# Patient Record
Sex: FEMALE | Race: WHITE | NOT HISPANIC OR LATINO | Employment: PART TIME | URBAN - METROPOLITAN AREA
[De-identification: names, ages, dates, MRNs, and addresses within clinical notes are randomized per-mention and may not be internally consistent; named-entity substitution may affect disease eponyms.]

---
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
## 2023-03-16 ENCOUNTER — TELEPHONE (OUTPATIENT)
Dept: FAMILY MEDICINE CLINIC | Facility: CLINIC | Age: 18
End: 2023-03-16

## 2023-03-16 NOTE — TELEPHONE ENCOUNTER
Caller: Natalya GEORGE    Relationship: Self    Best call back number: [phone]    What orders are you requesting (i.e. lab or imaging): BLOOD WORK    In what timeframe would the patient need to come in: ASAP    Additional notes: PATIENT WAS SCHEDULED FOR AN APPOINTMENT ON 3.16.2023, BUT WOULD PREFER TO JUST HAVE BLOOD WORK DONE TO SEE WHAT IS CAUSING HER PAIN IN ARMS. PLEASE CALL AND ADVISE.

## 2023-03-16 NOTE — TELEPHONE ENCOUNTER
Informed pt that we could not place lab work without seeing her and knowing her symptoms. Voiced understanding.

## 2023-03-17 ENCOUNTER — OFFICE VISIT (OUTPATIENT)
Dept: OBSTETRICS AND GYNECOLOGY | Facility: CLINIC | Age: 18
End: 2023-03-17
Payer: COMMERCIAL

## 2023-03-17 VITALS
HEIGHT: 67 IN | SYSTOLIC BLOOD PRESSURE: 104 MMHG | WEIGHT: 164.6 LBS | HEART RATE: 83 BPM | DIASTOLIC BLOOD PRESSURE: 72 MMHG | BODY MASS INDEX: 25.83 KG/M2

## 2023-03-17 DIAGNOSIS — R10.2 PELVIC PAIN: ICD-10-CM

## 2023-03-17 DIAGNOSIS — N76.0 ACUTE VAGINITIS: Primary | ICD-10-CM

## 2023-03-17 DIAGNOSIS — Z30.41 ENCOUNTER FOR SURVEILLANCE OF CONTRACEPTIVE PILLS: ICD-10-CM

## 2023-03-17 DIAGNOSIS — N94.10 DYSPAREUNIA IN FEMALE: ICD-10-CM

## 2023-03-17 LAB
B-HCG UR QL: NEGATIVE
BILIRUB BLD-MCNC: NEGATIVE MG/DL
C TRACH RRNA CVX QL NAA+PROBE: NOT DETECTED
CANDIDA SPECIES: POSITIVE
EXPIRATION DATE: NORMAL
GARDNERELLA VAGINALIS: NEGATIVE
GLUCOSE UR STRIP-MCNC: NEGATIVE MG/DL
INTERNAL NEGATIVE CONTROL: NORMAL
INTERNAL POSITIVE CONTROL: NORMAL
KETONES UR QL: NEGATIVE
LEUKOCYTE EST, POC: NEGATIVE
Lab: NORMAL
N GONORRHOEA RRNA SPEC QL NAA+PROBE: NOT DETECTED
NITRITE UR-MCNC: NEGATIVE MG/ML
PH UR: 5 [PH] (ref 5–8)
PROT UR STRIP-MCNC: NEGATIVE MG/DL
RBC # UR STRIP: NEGATIVE /UL
SP GR UR: 1.01 (ref 1–1.03)
T VAGINALIS DNA VAG QL PROBE+SIG AMP: NEGATIVE
UROBILINOGEN UR QL: NORMAL

## 2023-03-17 PROCEDURE — 87480 CANDIDA DNA DIR PROBE: CPT | Performed by: NURSE PRACTITIONER

## 2023-03-17 PROCEDURE — 1160F RVW MEDS BY RX/DR IN RCRD: CPT | Performed by: NURSE PRACTITIONER

## 2023-03-17 PROCEDURE — 87510 GARDNER VAG DNA DIR PROBE: CPT | Performed by: NURSE PRACTITIONER

## 2023-03-17 PROCEDURE — 1159F MED LIST DOCD IN RCRD: CPT | Performed by: NURSE PRACTITIONER

## 2023-03-17 PROCEDURE — 87491 CHLMYD TRACH DNA AMP PROBE: CPT | Performed by: NURSE PRACTITIONER

## 2023-03-17 PROCEDURE — 87660 TRICHOMONAS VAGIN DIR PROBE: CPT | Performed by: NURSE PRACTITIONER

## 2023-03-17 PROCEDURE — 99214 OFFICE O/P EST MOD 30 MIN: CPT | Performed by: NURSE PRACTITIONER

## 2023-03-17 PROCEDURE — 87591 N.GONORRHOEAE DNA AMP PROB: CPT | Performed by: NURSE PRACTITIONER

## 2023-03-17 PROCEDURE — 81025 URINE PREGNANCY TEST: CPT | Performed by: NURSE PRACTITIONER

## 2023-03-17 RX ORDER — ACETAMINOPHEN AND CODEINE PHOSPHATE 120; 12 MG/5ML; MG/5ML
1 SOLUTION ORAL DAILY
Qty: 84 TABLET | Refills: 3 | Status: SHIPPED | OUTPATIENT
Start: 2023-03-17 | End: 2024-03-16

## 2023-03-17 NOTE — PROGRESS NOTES
GYN Problem/Follow Up Visit    Chief Complaint   Patient presents with   • Follow-up     Possible Vaginal Infection           HPI  Natalya GEORGE is a 17 y.o. female, , who presents for painful intercourse, vaginal discharge, vaginal burning and itching x 2 months  Pelvic pain s/p csection- sharp poke comes and goes, occurs during sitting, intercourse, cervix feels sensitive.    Lactating, not using birth control, desires birth control, lo lo estrin caused bloating, didn't like it and discontinued after 1-2 month    No menstrual period    Limited vaginal lubrication s/p , all lubricants used otc cause vaginal burning sensation, occassional spotting with and after intercourse lasting a few hours  Hx of BV/yeast    Experienced a traumatic birth    Additional OB/GYN History   No LMP recorded. (Menstrual status: Other).  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 LABOR DELIVERY;  Service: Gynecology;  Laterality: N/A;   • TOOTH EXTRACTION     • WISDOM TOOTH EXTRACTION        Family History   Problem Relation Age of Onset   • Diabetes Mother    • Heart disease Mother    • Other Mother    • Hypertension Father    • Mitral valve prolapse Maternal Grandmother    • Mitral valve prolapse Paternal Grandmother    • Lung cancer Other      Allergies as of 2023 - Reviewed 2023   Allergen Reaction Noted   • Amoxicillin Rash 10/01/2019   • Amoxicillin-pot clavulanate Nausea Only and Rash 2012   • Cefprozil Rash 2012   • Sertraline Other (See Comments) 10/09/2019      The additional following  "portions of the patient's history were reviewed and updated as appropriate: allergies, current medications, past family history, past medical history, past social history, past surgical history and problem list.    Review of Systems    See HPI for pertinent ROS    Objective   /72   Pulse 83   Ht 170.2 cm (67\")   Wt 74.7 kg (164 lb 9.6 oz)   Breastfeeding Yes   BMI 25.78 kg/m²     Physical Exam  Vitals and nursing note reviewed. Exam conducted with a chaperone present.   Constitutional:       Appearance: Normal appearance.   Cardiovascular:      Rate and Rhythm: Normal rate.   Pulmonary:      Effort: Pulmonary effort is normal.   Abdominal:      Palpations: Abdomen is soft.   Genitourinary:     General: Normal vulva.      Vagina: No signs of injury. Tenderness (mild levator spasm) present. No vaginal discharge, erythema, bleeding or lesions.      Cervix: Normal.      Uterus: Normal. Tender (mildly). Not enlarged and not fixed.       Adnexa:         Right: Tenderness (mild) present. No mass or fullness.          Left: Tenderness (mild) present. No mass or fullness.     Lymphadenopathy:      Lower Body: No right inguinal adenopathy. No left inguinal adenopathy.   Skin:     General: Skin is warm and dry.   Neurological:      Mental Status: She is alert and oriented to person, place, and time.            Assessment and Plan    Diagnoses and all orders for this visit:    1. Acute vaginitis (Primary)  -     Chlamydia trachomatis, Neisseria gonorrhoeae, PCR - Swab, Cervix  -     Gardnerella vaginalis, Trichomonas vaginalis, Candida albicans, DNA - Swab, Vagina    2. Dyspareunia in female  -     Chlamydia trachomatis, Neisseria gonorrhoeae, PCR - Swab, Cervix  -     Gardnerella vaginalis, Trichomonas vaginalis, Candida albicans, DNA - Swab, Vagina  -     US Pelvis Transvaginal Non OB; Future    3. Pelvic pain  -     POC Urinalysis Dipstick  -     POC Pregnancy, Urine  -     US Pelvis Transvaginal Non OB; " Future    4. Encounter for surveillance of contraceptive pills  -     norethindrone (MICRONOR) 0.35 MG tablet; Take 1 tablet by mouth Daily.  Dispense: 84 tablet; Refill: 3      HCG, Urine, QL   Date Value Ref Range Status   03/17/2023 Negative Negative Final      Color   Date Value Ref Range Status   05/13/2022 Yellow Yellow, Straw, Dark Yellow, Astrid Final     Clarity, UA   Date Value Ref Range Status   05/13/2022 Clear (A) Clear Final     Specific Gravity    Date Value Ref Range Status   03/17/2023 1.015 1.005 - 1.030 Final     pH, Urine   Date Value Ref Range Status   03/17/2023 5.0 5.0 - 8.0 Final     Leukocytes   Date Value Ref Range Status   03/17/2023 Negative Negative Final     Nitrite, UA   Date Value Ref Range Status   03/17/2023 Negative Negative Final     Protein, POC   Date Value Ref Range Status   03/17/2023 Negative Negative mg/dL Final     Glucose, UA   Date Value Ref Range Status   03/17/2023 Negative Negative mg/dL Final     Ketones, UA   Date Value Ref Range Status   03/17/2023 Negative Negative Final     Urobilinogen, UA   Date Value Ref Range Status   03/17/2023 Normal Normal, 0.2 E.U./dL Final     Bilirubin   Date Value Ref Range Status   03/17/2023 Negative Negative Final     Blood, UA   Date Value Ref Range Status   03/17/2023 Negative Negative Final     Lot Number   Date Value Ref Range Status   03/17/2023 [id number]  Final     Expiration Date   Date Value Ref Range Status   03/17/2023 4/30/2024  Final      Counseling:  • TORSION precautions.  To ER immediately.  Questions answered.  She agrees to FU prn worsening or persistent pain.  • Recommend dilator therapy, s/p traumatic birth, mild levator ani spasm  • To minimize recurrence of Bacterial Vaginosis, recommend gentle cleansing of genital region, use hypoallergenic, Ph balanced, sensitive skin products. Avoid scented liners and tampons. Condom use, avoid multiple partners, avoid douching, bubble bath, anal contact during intercourse,  thong underwear, and shaving in genital area.   • Will call with results of the pelvic ultrasound and labs.  • Desires to restart the Norethindrone for birth control, reminded of time sensitive dosing.     She understands the importance of having the above orders performed in a timely fashion.  The risks of not performing them include, but are not limited to, cancer and/or subsequent increase in morbidity and/or mortality.  She is encouraged to review her results online and/or contact or office if she has questions.     Follow Up:  Return if symptoms worsen or fail to improve, for if diagnostic testing normal, will schedule dilator therapy.        Kaylie Tobar, APRN  03/17/2023

## 2023-03-19 DIAGNOSIS — B37.31 VAGINAL YEAST INFECTION: Primary | ICD-10-CM

## 2023-03-19 RX ORDER — FLUCONAZOLE 100 MG/1
100 TABLET ORAL
Qty: 2 TABLET | Refills: 0 | Status: SHIPPED | OUTPATIENT
Start: 2023-03-19 | End: 2023-03-28

## 2023-03-28 RX ORDER — FLUCONAZOLE 100 MG/1
TABLET ORAL
Qty: 2 TABLET | Refills: 0 | Status: SHIPPED | OUTPATIENT
Start: 2023-03-28

## 2023-04-13 ENCOUNTER — HOSPITAL ENCOUNTER (OUTPATIENT)
Dept: ULTRASOUND IMAGING | Facility: HOSPITAL | Age: 18
Discharge: HOME | End: 2023-04-13
Admitting: NURSE PRACTITIONER
Payer: COMMERCIAL

## 2023-04-13 DIAGNOSIS — N94.10 DYSPAREUNIA IN FEMALE: ICD-10-CM

## 2023-04-13 DIAGNOSIS — R10.2 PELVIC PAIN: ICD-10-CM

## 2023-04-13 PROCEDURE — 76830 TRANSVAGINAL US NON-OB: CPT

## 2023-04-13 PROCEDURE — 76856 US EXAM PELVIC COMPLETE: CPT

## 2023-08-23 ENCOUNTER — OFFICE VISIT (OUTPATIENT)
Dept: FAMILY MEDICINE CLINIC | Facility: CLINIC | Age: 18
End: 2023-08-23
Payer: COMMERCIAL

## 2023-08-23 VITALS
TEMPERATURE: 98 F | HEIGHT: 67 IN | WEIGHT: 179.6 LBS | HEART RATE: 69 BPM | DIASTOLIC BLOOD PRESSURE: 62 MMHG | SYSTOLIC BLOOD PRESSURE: 112 MMHG | BODY MASS INDEX: 28.19 KG/M2 | OXYGEN SATURATION: 98 %

## 2023-08-23 DIAGNOSIS — Z20.2 POSSIBLE EXPOSURE TO STD: ICD-10-CM

## 2023-08-23 DIAGNOSIS — F33.0 MILD EPISODE OF RECURRENT MAJOR DEPRESSIVE DISORDER: ICD-10-CM

## 2023-08-23 DIAGNOSIS — R53.83 FATIGUE, UNSPECIFIED TYPE: ICD-10-CM

## 2023-08-23 DIAGNOSIS — R20.0 NUMBNESS AND TINGLING: ICD-10-CM

## 2023-08-23 DIAGNOSIS — Z01.89 ROUTINE LAB DRAW: ICD-10-CM

## 2023-08-23 DIAGNOSIS — R20.2 NUMBNESS AND TINGLING: ICD-10-CM

## 2023-08-23 DIAGNOSIS — Z13.220 SCREENING FOR LIPID DISORDERS: ICD-10-CM

## 2023-08-23 DIAGNOSIS — M79.675 PAIN OF TOE OF LEFT FOOT: ICD-10-CM

## 2023-08-23 DIAGNOSIS — F41.9 ANXIETY: Primary | ICD-10-CM

## 2023-08-23 RX ORDER — PREDNISONE 20 MG/1
20 TABLET ORAL DAILY
Qty: 5 TABLET | Refills: 0 | Status: SHIPPED | OUTPATIENT
Start: 2023-08-23

## 2023-08-23 RX ORDER — ESCITALOPRAM OXALATE 5 MG/1
5 TABLET ORAL DAILY
COMMUNITY
Start: 2023-08-17

## 2023-08-23 NOTE — PROGRESS NOTES
"Natalya GEORGE presents to [facility] with complaints of numbness and tingling in left great toe, worsening anxiety, and is due for annual blood work.      History of Present Illness  This is a 18-year-old female who presents to the clinic with complaints of numbness and tingling left great toe, worsening anxiety, and is due for annual blood work.    Patient states that for the past several months she has noticed that the right side of her left great toe is numb.  States that she does not recall an injury, there is nothing that seems to trigger this, but just notices it.  Denies any pain, denies any swelling, denies any other symptoms.  Did want to mention, was unsure what could have caused this.  States that it does bother her, and she is just concerned that it might get worse.    Anxiety: Worsened.  Does continue to follow-up with psychiatry with Indira Ignacio.  Was on higher dose Lexapro, decreased herself down due to not feeling like herself and feeling like a zombie.  Currently remains on Lamictal.  Has tried several other medications in the past.  Work is very stressful, at this point does not know what else to do.  Had GeneSight done several years ago, but would be willing to do another test to see what other medicines work best for her.  Also would like to see a new psychiatrist.    Due for blood work, will order.    The following portions of the patient's history were personally reviewed and updated as appropriate: allergies, current medications, past medical history, past surgical history, past family history, and past social history.       Objective   Vital Signs:   /62 (BP Location: Left arm, Patient Position: Sitting, Cuff Size: Adult)   Pulse 69   Temp 98 øF (36.7 øC) (Temporal)   Ht 170.2 cm (67\")   Wt 81.5 kg (179 lb 9.6 oz)   SpO2 98%   BMI 28.13 kg/mý     Body mass index is 28.13 kg/mý.    All labs, imaging, test results, and specialty provider notes " reviewed with patient.     Physical Exam  Vitals reviewed.   Constitutional:       Appearance: Normal appearance.   Cardiovascular:      Rate and Rhythm: Normal rate and regular rhythm.      Pulses: Normal pulses.      Heart sounds: Normal heart sounds.   Pulmonary:      Effort: Pulmonary effort is normal.      Breath sounds: Normal breath sounds.   Neurological:      General: No focal deficit present.      Mental Status: She is alert and oriented to person, place, and time.                       Assessment and Plan:  Diagnoses and all orders for this visit:    1. Anxiety (Primary)  Assessment & Plan:  Worsened.  Obtain ID Genex.  Refer to different psychiatrist.  Continue Lamictal and Lexapro currently.  Strict ER precautions for any suicidal thoughts or ideation.    Orders:  -     Ambulatory Referral to Psychiatry    2. Screening for lipid disorders  -     Lipid Panel; Future    3. Routine lab draw  -     CBC Auto Differential; Future  -     Comprehensive Metabolic Panel; Future  -     TSH Rfx On Abnormal To Free T4; Future    4. Possible exposure to STD  Comments:  Was stuck with a needle at work, obtain testing.  Orders:  -     HSV 1 and 2-Specific Ab, IgG; Future  -     HIV-1/O/2 ANTIGEN/ANTIBODY, 4TH GENERATION; Future    5. Mild episode of recurrent major depressive disorder  -     Ambulatory Referral to Psychiatry    6. Numbness and tingling  Comments:  Question whether underlying injury could be contributing.  Give burst of prednisone.  Orders:  -     predniSONE (DELTASONE) 20 MG tablet; Take 1 tablet by mouth Daily.  Dispense: 5 tablet; Refill: 0    7. Pain of toe of left foot  -     predniSONE (DELTASONE) 20 MG tablet; Take 1 tablet by mouth Daily.  Dispense: 5 tablet; Refill: 0    8. Fatigue, unspecified type  -     Vitamin B12 & Folate; Future          Follow Up:  Return in about 3 months (around 11/23/2023).    Patient was given instructions and counseling regarding her condition or for health  maintenance advice. Please see specific information pulled into the AVS if appropriate.

## 2023-08-23 NOTE — ASSESSMENT & PLAN NOTE
Worsened.  Obtain ID Genex.  Refer to different psychiatrist.  Continue Lamictal and Lexapro currently.  Strict ER precautions for any suicidal thoughts or ideation.

## 2023-08-24 ENCOUNTER — LAB (OUTPATIENT)
Dept: LAB | Facility: HOSPITAL | Age: 18
End: 2023-08-24
Payer: COMMERCIAL

## 2023-08-24 DIAGNOSIS — Z13.220 SCREENING FOR LIPID DISORDERS: ICD-10-CM

## 2023-08-24 DIAGNOSIS — Z01.89 ROUTINE LAB DRAW: ICD-10-CM

## 2023-08-24 DIAGNOSIS — R53.83 FATIGUE, UNSPECIFIED TYPE: ICD-10-CM

## 2023-08-24 DIAGNOSIS — Z20.2 POSSIBLE EXPOSURE TO STD: ICD-10-CM

## 2023-08-24 LAB
ALBUMIN SERPL-MCNC: 4.7 G/DL (ref 3.5–5.2)
ALBUMIN/GLOB SERPL: 2.4 G/DL
ALP SERPL-CCNC: 66 U/L (ref 39–117)
ALT SERPL W P-5'-P-CCNC: 21 U/L (ref 1–33)
ANION GAP SERPL CALCULATED.3IONS-SCNC: 7.1 MMOL/L (ref 5–15)
AST SERPL-CCNC: 21 U/L (ref 1–32)
BASOPHILS # BLD AUTO: 0.03 10*3/MM3 (ref 0–0.2)
BASOPHILS NFR BLD AUTO: 0.6 % (ref 0–1.5)
BILIRUB SERPL-MCNC: 0.3 MG/DL (ref 0–1.2)
BUN SERPL-MCNC: 12 MG/DL (ref 6–20)
BUN/CREAT SERPL: 20.7 (ref 7–25)
CALCIUM SPEC-SCNC: 9.3 MG/DL (ref 8.6–10.5)
CHLORIDE SERPL-SCNC: 104 MMOL/L (ref 98–107)
CHOLEST SERPL-MCNC: 183 MG/DL (ref 0–200)
CO2 SERPL-SCNC: 25.9 MMOL/L (ref 22–29)
CREAT SERPL-MCNC: 0.58 MG/DL (ref 0.57–1)
DEPRECATED RDW RBC AUTO: 38.3 FL (ref 37–54)
EGFRCR SERPLBLD CKD-EPI 2021: 133.1 ML/MIN/1.73
EOSINOPHIL # BLD AUTO: 0.07 10*3/MM3 (ref 0–0.4)
EOSINOPHIL NFR BLD AUTO: 1.4 % (ref 0.3–6.2)
ERYTHROCYTE [DISTWIDTH] IN BLOOD BY AUTOMATED COUNT: 12.1 % (ref 12.3–15.4)
FOLATE SERPL-MCNC: >20 NG/ML (ref 4.78–24.2)
GLOBULIN UR ELPH-MCNC: 2 GM/DL
GLUCOSE SERPL-MCNC: 81 MG/DL (ref 65–99)
HCT VFR BLD AUTO: 40.2 % (ref 34–46.6)
HDLC SERPL-MCNC: 79 MG/DL (ref 40–60)
HGB BLD-MCNC: 13.5 G/DL (ref 12–15.9)
IMM GRANULOCYTES # BLD AUTO: 0 10*3/MM3 (ref 0–0.05)
IMM GRANULOCYTES NFR BLD AUTO: 0 % (ref 0–0.5)
LDLC SERPL CALC-MCNC: 97 MG/DL (ref 0–100)
LDLC/HDLC SERPL: 1.23 {RATIO}
LYMPHOCYTES # BLD AUTO: 2.19 10*3/MM3 (ref 0.7–3.1)
LYMPHOCYTES NFR BLD AUTO: 43.1 % (ref 19.6–45.3)
MCH RBC QN AUTO: 29.6 PG (ref 26.6–33)
MCHC RBC AUTO-ENTMCNC: 33.6 G/DL (ref 31.5–35.7)
MCV RBC AUTO: 88.2 FL (ref 79–97)
MONOCYTES # BLD AUTO: 0.35 10*3/MM3 (ref 0.1–0.9)
MONOCYTES NFR BLD AUTO: 6.9 % (ref 5–12)
NEUTROPHILS NFR BLD AUTO: 2.44 10*3/MM3 (ref 1.7–7)
NEUTROPHILS NFR BLD AUTO: 48 % (ref 42.7–76)
NRBC BLD AUTO-RTO: 0 /100 WBC (ref 0–0.2)
PLATELET # BLD AUTO: 222 10*3/MM3 (ref 140–450)
PMV BLD AUTO: 11 FL (ref 6–12)
POTASSIUM SERPL-SCNC: 4.1 MMOL/L (ref 3.5–5.2)
PROT SERPL-MCNC: 6.7 G/DL (ref 6–8.5)
RBC # BLD AUTO: 4.56 10*6/MM3 (ref 3.77–5.28)
SODIUM SERPL-SCNC: 137 MMOL/L (ref 136–145)
TRIGL SERPL-MCNC: 33 MG/DL (ref 0–150)
TSH SERPL DL<=0.05 MIU/L-ACNC: 1.07 UIU/ML (ref 0.27–4.2)
VIT B12 BLD-MCNC: 443 PG/ML (ref 211–946)
VLDLC SERPL-MCNC: 7 MG/DL (ref 5–40)
WBC NRBC COR # BLD: 5.08 10*3/MM3 (ref 3.4–10.8)

## 2023-08-24 PROCEDURE — 82746 ASSAY OF FOLIC ACID SERUM: CPT

## 2023-08-24 PROCEDURE — 36415 COLL VENOUS BLD VENIPUNCTURE: CPT

## 2023-08-24 PROCEDURE — 86695 HERPES SIMPLEX TYPE 1 TEST: CPT

## 2023-08-24 PROCEDURE — 86696 HERPES SIMPLEX TYPE 2 TEST: CPT

## 2023-08-24 PROCEDURE — G0432 EIA HIV-1/HIV-2 SCREEN: HCPCS

## 2023-08-24 PROCEDURE — 82607 VITAMIN B-12: CPT

## 2023-08-24 PROCEDURE — 80050 GENERAL HEALTH PANEL: CPT

## 2023-08-24 PROCEDURE — 80061 LIPID PANEL: CPT

## 2023-08-25 LAB — HIV 1+2 AB+HIV1 P24 AG SERPL QL IA: NORMAL

## 2023-08-26 LAB
HSV1 IGG SER IA-ACNC: <0.91 INDEX (ref 0–0.9)
HSV2 IGG SER IA-ACNC: <0.91 INDEX (ref 0–0.9)

## 2023-09-13 RX ORDER — PAROXETINE 10 MG/1
10 TABLET, FILM COATED ORAL EVERY MORNING
Qty: 30 TABLET | Refills: 2 | Status: SHIPPED | OUTPATIENT
Start: 2023-09-13

## 2023-11-16 NOTE — PROGRESS NOTES
Chief Complaint:  Anxiety     History of Present Illness: Natalya GEORGE is a 18 y.o. female who presents today referred by PCP Denise REDDY. Pt c/o depression that comes and goes, occurs a few times a week, related to thinking about how big her child is getting, rates it a 2/10. Pt denies having any SI or HI. No access to firearms. No h/o suicide attempts or self harm. No difficulty sleeping. Pt reports last hypomanic episode was in 2021 that involved sleep deficit, impulsiveness, more activities, more talkative, although thinks this may have been to situational and childhood factors. No other manic/hypomanic episodes over the past 2 years or when on antidepressants. Pt c/o anxiety that is constant and rates it a 8/10, attributes to work. Pt will have panic attacks every few months. She also c/o irritability. No symptoms of PTSD. Pt denies AVH. Pt is currently breastfeeding. She also c/o difficulty concentrating onset since high school. Pt had A's and B's in elementary and middle school. Pt had A's, B's, and C's for grades in high school. No missing or late assignments. Pt would often get in trouble for talking. No FH of ADHD.  Patient reports being diagnosed with ADHD by a psychiatrist although states assessment involved a single paper questionnaire.      Medical Record Review: Reviewed office visit note from 8/23/23, Anxiety: Worsened.  Does continue to follow-up with psychiatry with Indira Ignacio.  Was on higher dose Lexapro, decreased herself down due to not feeling like herself and feeling like a zombie.  Currently remains on Lamictal.  Has tried several other medications in the past.  Work is very stressful, at this point does not know what else to do.  Had GeneSight done several years ago, but would be willing to do another test to see what other medicines work best for her.  Also would like to see a new psychiatrist.      PHQ-9 Depression Screening  Little interest or pleasure in doing things?      Feeling down, depressed, or hopeless?     Trouble falling or staying asleep, or sleeping too much?     Feeling tired or having little energy?     Poor appetite or overeating?     Feeling bad about yourself - or that you are a failure or have let yourself or your family down?     Trouble concentrating on things, such as reading the newspaper or watching television?     Moving or speaking so slowly that other people could have noticed? Or the opposite - being so fidgety or restless that you have been moving around a lot more than usual?     Thoughts that you would be better off dead, or of hurting yourself in some way?     PHQ-9 Total Score     If you checked off any problems, how difficult have these problems made it for you to do your work, take care of things at home, or get along with other people?           HERMAN-7         ROS:  Review of Systems   Constitutional:  Positive for fatigue. Negative for appetite change, diaphoresis and unexpected weight change.   HENT:  Negative for drooling, tinnitus and trouble swallowing.    Eyes:  Negative for visual disturbance.   Respiratory:  Negative for cough, chest tightness and shortness of breath.    Cardiovascular:  Negative for chest pain and palpitations.   Gastrointestinal:  Negative for abdominal pain, constipation, diarrhea, nausea and vomiting.   Endocrine: Negative for cold intolerance and heat intolerance.   Genitourinary:  Negative for difficulty urinating.   Musculoskeletal:  Negative for arthralgias and myalgias.   Skin:  Negative for rash.   Allergic/Immunologic: Negative for immunocompromised state.   Neurological:  Negative for dizziness, tremors, seizures and headaches.   Psychiatric/Behavioral:  Positive for agitation and dysphoric mood. Negative for hallucinations, self-injury, sleep disturbance and suicidal ideas. The patient is nervous/anxious.        Problem List:  Patient Active Problem List   Diagnosis    Anxiety    Depression    ADHD    Bipolar 1  disorder    Supervision of other normal pregnancy, antepartum    Non-reassuring electronic fetal monitoring tracing    37 weeks gestation of pregnancy     delivery delivered    Eczema    Obese    Viral respiratory infection       Current Medications:   Current Outpatient Medications   Medication Sig Dispense Refill    lamoTRIgine (LaMICtal) 25 MG tablet Take 1 tablet by mouth Daily for 90 days. 90 tablet 0     No current facility-administered medications for this visit.       Discontinued Medications:  Medications Discontinued During This Encounter   Medication Reason    lamoTRIgine (LaMICtal) 25 MG tablet     Lexapro 10 MG tablet     PARoxetine (Paxil) 10 MG tablet     predniSONE (DELTASONE) 20 MG tablet     Jencycla 0.35 MG tablet     triamcinolone (KENALOG) 0.1 % ointment     lamoTRIgine (LaMICtal) 25 MG tablet Reorder       Allergy:   Allergies   Allergen Reactions    Amoxicillin Rash    Amoxicillin-Pot Clavulanate Nausea Only and Rash    Cefprozil Rash    Sertraline Other (See Comments)     Suicidal thoughts   Suicidal thoughts           Past Medical History:  Past Medical History:   Diagnosis Date    Bipolar affect, depressed     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 Alberto), ADHD (Indira Ignacio), bipolar (Indira Ignacio)  Psychiatrist: Pt last saw Indira Ignacio 2-3 month ago.   Therapist: Pt last did therapy 3 years ago.   Admission History: Pt was admitted in  twice for SI.   Medications/Treatment: Zoloft, Lexapro, Adderall, Pristiq, Paxil, Effexor,  Lamictal, Vyvanse, Seroquel (weight gain), Buspar, Abilify   Self Harm: Denies  Suicide Attempts: Denies  Postpartum depression: Pt was diagnosed with postpartum depression.     Family Psychiatric History:   Diagnoses: FH of father with bipolar.   Substance use: FH of father with EtOH abuse.   Suicide Attempts/Completions: Denies    Family History   Problem Relation Age of Onset    Diabetes Mother     Heart disease Mother     Other Mother     Hypertension Father     Mitral valve prolapse Maternal Grandmother     Mitral valve prolapse Paternal Grandmother     Lung cancer Other        Substance Abuse History:   Alcohol use: Denies  Nicotine: Denies  Illicit Drug Use: Denies  Longest Period Sober: Denies  Rehab/AA/NA: Denies    Social History:  Living Situation: Pt lives with her , daughter, and mat grandmother.   Marital/Relationship History:  since 1/2023. No abuse or trauma.   Children: Pt has a 1.5 yr/o daughter.   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   Education: Pt completed high school, some college.    History: Denies  Legal: Denies    Social History     Socioeconomic History    Marital status:    Tobacco Use    Smoking status: Never    Smokeless tobacco: Never   Vaping Use    Vaping Use: Former    Substances: Flavoring    Devices: Pre-filled or refillable cartridge   Substance and Sexual Activity    Alcohol use: Not Currently    Drug use: Never    Sexual activity: Yes     Partners: Male     Birth control/protection: Condom       Developmental History:   Place of birth: [facility]  Siblings: 2 brothers, 1 step brother, 3 adopted siblings.   Childhood: Pt reports parents  when she was 6 yr/o. No abuse, trauma, or neglect.       Physical Exam:  Physical Exam    Appearance: Well-groomed with adequate hygiene, appears to be of stated age. Casually and neatly dressed, maintains good eye contact. Pt appears tired.  Behavior: Appropriate, cooperative. No acute  "distress.  Motor: No abnormal movements, tics or tremors are noted. No psychomotor agitation or retardation.  Speech: Coherent, spontaneous, appropriate with normal rate, volume, rhythm, and tone. Normal reaction time to questions. No hyperverbal or pressured speech.   Mood: \"I'm okay\"  Affect: Patient appears slightly anxious at times.   Thought content: Negative suicidal ideations, negative homicidal ideations. Patient denies any obsession, compulsion, or phobia. No evidence of delusions.  Perceptions: Negative auditory hallucinations, negative visual hallucinations. Pt does not appear to be actively responding to internal stimuli.   Thought process: Logical, goal-directed, coherent, and linear with no evidence of flight of ideas, looseness of associations, thought blocking, circumstantiality, or tangentiality.   Insight/Judgement: Fair/fair  Cognition: Alert and oriented to person, place, and date. Memory intact for recent and remote events. Attention and concentration intact.     Vital Signs:   /66   Ht 170.2 cm (67.01\")   Wt 82.7 kg (182 lb 4.8 oz)   BMI 28.55 kg/m²      Lab Results:   Admission on 08/27/2023, Discharged on 08/27/2023   Component Date Value Ref Range Status    SARS Antigen 08/27/2023 Not Detected  Not Detected, Presumptive Negative Final    Internal Control 08/27/2023 Passed  Passed Final    Lot Number 08/27/2023 [id number]   Final    Expiration Date 08/27/2023 10/2/23   Final    Rapid Influenza A Ag 08/27/2023 Negative  Negative Final    Rapid Influenza B Ag 08/27/2023 Negative  Negative Final    Internal Control 08/27/2023 Passed  Passed Final    Lot Number 08/27/2023 [id number]   Final    Expiration Date 08/27/2023 11/30/24   Final    Rapid Strep A Screen 08/27/2023 Negative  Negative, VALID, INVALID, Not Performed Final    Internal Control 08/27/2023 Passed  Passed Final    Lot Number 08/27/2023 [id number]   Final    Expiration Date 08/27/2023 12/31/24   Final   Lab on 08/24/2023 "   Component Date Value Ref Range Status    WBC 08/24/2023 5.08  3.40 - 10.80 10*3/mm3 Final    RBC 08/24/2023 4.56  3.77 - 5.28 10*6/mm3 Final    Hemoglobin 08/24/2023 13.5  12.0 - 15.9 g/dL Final    Hematocrit 08/24/2023 40.2  34.0 - 46.6 % Final    MCV 08/24/2023 88.2  79.0 - 97.0 fL Final    MCH 08/24/2023 29.6  26.6 - 33.0 pg Final    MCHC 08/24/2023 33.6  31.5 - 35.7 g/dL Final    RDW 08/24/2023 12.1 (L)  12.3 - 15.4 % Final    RDW-SD 08/24/2023 38.3  37.0 - 54.0 fl Final    MPV 08/24/2023 11.0  6.0 - 12.0 fL Final    Platelets 08/24/2023 222  140 - 450 10*3/mm3 Final    Neutrophil % 08/24/2023 48.0  42.7 - 76.0 % Final    Lymphocyte % 08/24/2023 43.1  19.6 - 45.3 % Final    Monocyte % 08/24/2023 6.9  5.0 - 12.0 % Final    Eosinophil % 08/24/2023 1.4  0.3 - 6.2 % Final    Basophil % 08/24/2023 0.6  0.0 - 1.5 % Final    Immature Grans % 08/24/2023 0.0  0.0 - 0.5 % Final    Neutrophils, Absolute 08/24/2023 2.44  1.70 - 7.00 10*3/mm3 Final    Lymphocytes, Absolute 08/24/2023 2.19  0.70 - 3.10 10*3/mm3 Final    Monocytes, Absolute 08/24/2023 0.35  0.10 - 0.90 10*3/mm3 Final    Eosinophils, Absolute 08/24/2023 0.07  0.00 - 0.40 10*3/mm3 Final    Basophils, Absolute 08/24/2023 0.03  0.00 - 0.20 10*3/mm3 Final    Immature Grans, Absolute 08/24/2023 0.00  0.00 - 0.05 10*3/mm3 Final    nRBC 08/24/2023 0.0  0.0 - 0.2 /100 WBC Final    Glucose 08/24/2023 81  65 - 99 mg/dL Final    BUN 08/24/2023 12  6 - 20 mg/dL Final    Creatinine 08/24/2023 0.58  0.57 - 1.00 mg/dL Final    Sodium 08/24/2023 137  136 - 145 mmol/L Final    Potassium 08/24/2023 4.1  3.5 - 5.2 mmol/L Final    Chloride 08/24/2023 104  98 - 107 mmol/L Final    CO2 08/24/2023 25.9  22.0 - 29.0 mmol/L Final    Calcium 08/24/2023 9.3  8.6 - 10.5 mg/dL Final    Total Protein 08/24/2023 6.7  6.0 - 8.5 g/dL Final    Albumin 08/24/2023 4.7  3.5 - 5.2 g/dL Final    ALT (SGPT) 08/24/2023 21  1 - 33 U/L Final    AST (SGOT) 08/24/2023 21  1 - 32 U/L Final    Alkaline  Phosphatase 08/24/2023 66  39 - 117 U/L Final    Total Bilirubin 08/24/2023 0.3  0.0 - 1.2 mg/dL Final    Globulin 08/24/2023 2.0  gm/dL Final    A/G Ratio 08/24/2023 2.4  g/dL Final    BUN/Creatinine Ratio 08/24/2023 20.7  7.0 - 25.0 Final    Anion Gap 08/24/2023 7.1  5.0 - 15.0 mmol/L Final    eGFR 08/24/2023 133.1  >60.0 mL/min/1.73 Final    Total Cholesterol 08/24/2023 183  0 - 200 mg/dL Final    Triglycerides 08/24/2023 33  0 - 150 mg/dL Final    HDL Cholesterol 08/24/2023 79 (H)  40 - 60 mg/dL Final    LDL Cholesterol  08/24/2023 97  0 - 100 mg/dL Final    VLDL Cholesterol 08/24/2023 7  5 - 40 mg/dL Final    LDL/HDL Ratio 08/24/2023 1.23   Final    TSH 08/24/2023 1.070  0.270 - 4.200 uIU/mL Final    Folate 08/24/2023 >20.00  4.78 - 24.20 ng/mL Final    Vitamin B-12 08/24/2023 443  211 - 946 pg/mL Final    HSV 1 IgG, Type Specific 08/24/2023 <0.91  0.00 - 0.90 index Final                                     Negative        <0.91                                   Equivocal 0.91 - 1.09                                   Positive        >1.09   Note: Negative indicates no antibodies detected to   HSV-1. Equivocal may suggest early infection.  If   clinically appropriate, retest at later date. Positive   indicates antibodies detected to HSV-1.    HSV 2 IgG, Type Spec 08/24/2023 <0.91  0.00 - 0.90 index Final                                     Negative        <0.91                                   Equivocal 0.91 - 1.09                                   Positive        >1.09   HSV-2 Antibody Interpretation: Negative indicates no   detectable antibodies to HSV-2 were found. If recent   exposure is suspected, retest in 4-6 weeks. Equivocal   samples should be retested in 4-6 weeks. Positive   indicates the presence of detectable IgG antibody to   HSV-2. False positive results may occur. Repeat   testing, or testing by a different method, may be   indicated in some settings (e.g. patients with low   likelihood of HSV  infection). If clinically   appropriate, retest 4-6 weeks later.    HIV-1/ HIV-2 08/24/2023 Non-Reactive  Non-Reactive Final    A non-reactive test result does not preclude the possibility of exposure to HIV or infection with HIV. An antibody response to recent exposure may take several months to reach detectable levels.   Admission on 07/03/2023, Discharged on 07/03/2023   Component Date Value Ref Range Status    Rapid Strep A Screen 07/03/2023 Negative   Final    Internal Control 07/03/2023 Passed   Final    Lot Number 07/03/2023 [id number]   Final    Expiration Date 07/03/2023 11/30/2024   Final    Rapid Influenza A Ag 07/03/2023 Negative  Negative Final    Rapid Influenza B Ag 07/03/2023 Negative  Negative Final    Internal Control 07/03/2023 Passed  Passed Final    Lot Number 07/03/2023 [id number]   Final    Expiration Date 07/03/2023 10/29/2024   Final    SARS Antigen 07/03/2023 Not Detected  Not Detected, Presumptive Negative Final    Internal Control 07/03/2023 Passed  Passed Final    Lot Number 07/03/2023 [id number]   Final    Expiration Date 07/03/2023 10/02/2023   Final   Office Visit on 03/17/2023   Component Date Value Ref Range Status    Chlamydia DNA by PCR 03/17/2023 Not Detected  Not Detected  Final    Neisseria gonorrhoeae by PCR 03/17/2023 Not Detected  Not Detected  Final    GARDNERELLA VAGINALIS 03/17/2023 Negative  Negative Final    TRICHOMONAS VAGINALIS 03/17/2023 Negative  Negative Final    CALDERON SPECIES 03/17/2023 Positive (A)  Negative Final    Glucose, UA 03/17/2023 Negative  Negative mg/dL Final    Bilirubin 03/17/2023 Negative  Negative Final    Ketones, UA 03/17/2023 Negative  Negative Final    Specific Gravity  03/17/2023 1.015  1.005 - 1.030 Final    Blood, UA 03/17/2023 Negative  Negative Final    pH, Urine 03/17/2023 5.0  5.0 - 8.0 Final    Protein, POC 03/17/2023 Negative  Negative mg/dL Final    Urobilinogen, UA 03/17/2023 Normal  Normal, 0.2 E.U./dL Final    Leukocytes 03/17/2023  Negative  Negative Final    Nitrite, UA 03/17/2023 Negative  Negative Final    HCG, Urine, QL 03/17/2023 Negative  Negative Final    Lot Number 03/17/2023 [id number]   Final    Internal Positive Control 03/17/2023 Passed  Positive, Passed Final    Internal Negative Control 03/17/2023 Passed  Negative, Passed Final    Expiration Date 03/17/2023 4/30/2024   Final       EKG Results:  No orders to display       Imaging Results:  No Images in the past 120 days found..      Assessment & Plan   Diagnoses and all orders for this visit:    1. Mild episode of recurrent major depressive disorder (Primary)  -     Ambulatory Referral to Psychotherapy  -     lamoTRIgine (LaMICtal) 25 MG tablet; Take 1 tablet by mouth Daily for 90 days.  Dispense: 90 tablet; Refill: 0    2. Generalized anxiety disorder  -     Ambulatory Referral to Psychotherapy  -     lamoTRIgine (LaMICtal) 25 MG tablet; Take 1 tablet by mouth Daily for 90 days.  Dispense: 90 tablet; Refill: 0    3. Panic disorder  -     Ambulatory Referral to Psychotherapy  -     lamoTRIgine (LaMICtal) 25 MG tablet; Take 1 tablet by mouth Daily for 90 days.  Dispense: 90 tablet; Refill: 0      Patient screened positive for depression based on a PHQ-9 score of 0 on 8/23/2023. Follow-up recommendations include: Prescribed antidepressant medication treatment, Referral to Mental Health specialist, Suicide Risk Assessment performed, and see assessment below .    Presentation seems most consistent with HERMAN, panic disorder, MDD (vs bipolar).  Extensively discussed pregnancy considerations and medication options such as Seroquel or olanzapine.  Patient declines these medications.  Discussed increasing Lamictal.  Patient declines this as well.  She would like to continue with current dose of Lamictal until she is finished with breast-feeding.  Extensively discussed breast-feeding considerations and that RID is less than 10% which is considered acceptable.  Counseled the patient on the  need to continue monitoring for any changes in her child's behavior, appetite, or any other symptoms.  Patient verbalized understanding and would like to continue with Lamictal.  Discussed concerns patient not having an appropriate ADHD assessment, will obtain records from past psych provider.  We will refer for psychotherapy.  Instructed patient to contact the office for any new or worsening symptoms or any other concerns.  Follow-up in 1 month.  Addressed all questions and concerns.      Visit Diagnoses:    ICD-10-CM ICD-9-CM   1. Mild episode of recurrent major depressive disorder  F33.0 296.31   2. Generalized anxiety disorder  F41.1 300.02   3. Panic disorder  F41.0 300.01       PLAN:  Safety: No acute safety concerns at this time.  Therapy: We will refer to Jeniffer Nolasco.  Risk Assessment: Risk of self-harm acutely is moderate.  Risk factors include anxiety disorder, mood disorder, and recent psychosocial stressors (pandemic). Protective factors include no family history, denies access to guns/weapons, no present SI, no history of suicide attempts or self-harm in the past, minimal AODA, healthcare seeking, future orientation, willingness to engage in care.  Risk of self-harm chronically is also moderate, but could be further elevated in the event of treatment noncompliance and/or AODA.  Labs/Diagnostics Ordered:   Orders Placed This Encounter   Procedures    Ambulatory Referral to Psychotherapy     Medications:   New Medications Ordered This Visit   Medications    lamoTRIgine (LaMICtal) 25 MG tablet     Sig: Take 1 tablet by mouth Daily for 90 days.     Dispense:  90 tablet     Refill:  0       Discussed all risks, benefits, alternatives, and side effects of Lamictal, including but not limited to rash, rebound depressive or manic symptoms if prompt discontinuation, GI upset, agitation, and sedation. Pt instructed to avoid driving and doing other tasks or actions that require to be alert until knowing how the  drug affects them.  Pt informed that birth control pills and other hormone-based birth control may not work as well to prevent pregnancy and advised to use some other kind of birth control, such as condoms, while taking this med. Discussed the need for pt to immediately call the office for any new or worsening symptoms, such as rash, worsening depression; feeling nervous or restless; suicidal thoughts or actions; or other changes changes in mood or behavior, and all other concerns. Pt educated on med compliance and the risks of suddenly stopping this medication or missing doses. Pt verbalized understanding and is agreeable to taking Lamictal. Addressed all questions and concerns.     Follow up: F/u in 1 month.      TREATMENT PLAN/GOALS: Continue supportive psychotherapy efforts and medications as indicated. Treatment and medication options discussed during today's visit. Patient ackowledged and verbally consented to continue with current treatment plan and was educated on the importance of compliance with treatment and follow-up appointments.    MEDICATION ISSUES:  LYNETTE reviewed as expected.  Discussed medication options and treatment plan of prescribed medication as well as the risks, benefits, and side effects including potential falls, possible impaired driving and metabolic adversities among others. Patient is agreeable to call the office with any worsening of symptoms or onset of side effects. Patient is agreeable to call 911 or go to the nearest ER should he/she begin having SI/HI. No medication side effects or related complaints today.            This document has been electronically signed by Ericka Garduno PA-C  November 17, 2023 09:28 EST      Part of this note may be an electronic transcription/translation of spoken language to printed text using the Dragon Dictation System.

## 2023-11-17 ENCOUNTER — OFFICE VISIT (OUTPATIENT)
Dept: BEHAVIORAL HEALTH | Facility: CLINIC | Age: 18
End: 2023-11-17
Payer: COMMERCIAL

## 2023-11-17 VITALS
DIASTOLIC BLOOD PRESSURE: 66 MMHG | HEIGHT: 67 IN | WEIGHT: 182.3 LBS | SYSTOLIC BLOOD PRESSURE: 124 MMHG | BODY MASS INDEX: 28.61 KG/M2

## 2023-11-17 DIAGNOSIS — F41.1 GENERALIZED ANXIETY DISORDER: ICD-10-CM

## 2023-11-17 DIAGNOSIS — F41.0 PANIC DISORDER: ICD-10-CM

## 2023-11-17 DIAGNOSIS — F33.0 MILD EPISODE OF RECURRENT MAJOR DEPRESSIVE DISORDER: Primary | ICD-10-CM

## 2023-11-17 PROBLEM — E66.9 OBESE: Status: ACTIVE | Noted: 2023-11-17

## 2023-11-17 PROBLEM — B97.89 VIRAL RESPIRATORY INFECTION: Status: ACTIVE | Noted: 2023-11-17

## 2023-11-17 PROBLEM — J98.8 VIRAL RESPIRATORY INFECTION: Status: ACTIVE | Noted: 2023-11-17

## 2023-11-17 PROBLEM — L30.9 ECZEMA: Status: ACTIVE | Noted: 2023-11-17

## 2023-11-17 RX ORDER — NORETHINDRONE 0.35 MG/1
TABLET ORAL
COMMUNITY
Start: 2023-08-31 | End: 2023-11-17

## 2023-11-17 RX ORDER — ESCITALOPRAM OXALATE 10 MG/1
TABLET, FILM COATED ORAL EVERY 24 HOURS
COMMUNITY
End: 2023-11-17

## 2023-11-17 RX ORDER — TRIAMCINOLONE ACETONIDE 1 MG/G
OINTMENT TOPICAL
COMMUNITY
Start: 2023-09-27 | End: 2023-11-17

## 2023-11-17 RX ORDER — LAMOTRIGINE 25 MG/1
25 TABLET ORAL DAILY
Qty: 90 TABLET | Refills: 0 | Status: SHIPPED | OUTPATIENT
Start: 2023-11-17 | End: 2024-02-15

## 2023-11-22 ENCOUNTER — PATIENT ROUNDING (BHMG ONLY) (OUTPATIENT)
Dept: PSYCHIATRY | Facility: CLINIC | Age: 18
End: 2023-11-22
Payer: COMMERCIAL

## 2023-11-22 NOTE — PROGRESS NOTES
Hello, may I speak with Natalya GEORGE ?     My name is Ted      I am  with [facility]  [facility]  [address]  [place] [address] [phone].     Before we get started may I verify your date of birth? 2005      I am calling to officially welcome you to our practice and ask about your recent visit. Is this a good time to talk? Yes     Tell me about your visit with us. What things went well?     Everything was good     We're always looking for ways to make our patients' experiences even better. Do you have recommendations on ways we may improve?    nope     Overall were you satisfied with your first visit to our practice?    Yes I was      I appreciate you taking the time to speak with me today. Is there anything else I can do for you?    No thank you      Thank you, and have a great day.

## 2023-11-28 ENCOUNTER — TELEMEDICINE (OUTPATIENT)
Dept: FAMILY MEDICINE CLINIC | Facility: CLINIC | Age: 18
End: 2023-11-28
Payer: COMMERCIAL

## 2023-11-28 DIAGNOSIS — J06.9 VIRAL UPPER RESPIRATORY TRACT INFECTION: ICD-10-CM

## 2023-11-28 DIAGNOSIS — R05.1 ACUTE COUGH: ICD-10-CM

## 2023-11-28 DIAGNOSIS — F41.9 ANXIETY: Primary | ICD-10-CM

## 2023-11-28 DIAGNOSIS — L23.9 ALLERGIC CONTACT DERMATITIS, UNSPECIFIED TRIGGER: ICD-10-CM

## 2023-11-28 DIAGNOSIS — F33.0 MILD EPISODE OF RECURRENT MAJOR DEPRESSIVE DISORDER: ICD-10-CM

## 2023-11-28 PROCEDURE — 99213 OFFICE O/P EST LOW 20 MIN: CPT

## 2023-11-28 RX ORDER — FLUTICASONE PROPIONATE 50 MCG
SPRAY, SUSPENSION (ML) NASAL
COMMUNITY
Start: 2023-11-27

## 2023-11-28 RX ORDER — BROMPHENIRAMINE MALEATE, PSEUDOEPHEDRINE HYDROCHLORIDE, AND DEXTROMETHORPHAN HYDROBROMIDE 2; 30; 10 MG/5ML; MG/5ML; MG/5ML
5 SYRUP ORAL 4 TIMES DAILY PRN
Qty: 473 ML | Refills: 0 | Status: SHIPPED | OUTPATIENT
Start: 2023-11-28

## 2023-11-28 RX ORDER — TRIAMCINOLONE ACETONIDE 1 MG/G
1 OINTMENT TOPICAL 2 TIMES DAILY
Qty: 80 G | Refills: 2 | Status: SHIPPED | OUTPATIENT
Start: 2023-11-28

## 2023-11-28 NOTE — LETTER
November 28, 2023     Patient: Natalya GEORGE   YOB: 2005   Date of Visit: 11/28/2023       To Whom It May Concern:    It is my medical opinion that Natalya GEORGE may be excused from work on 11/27/2023 and 11/28/2023. She may return to work on Wednesday, 11/29/2023. If you have any questions/concerns, feel free to contact our office.            Sincerely,        BARRY Jean

## 2023-11-28 NOTE — PROGRESS NOTES
Natalya GEORGE presents to [facility] who presents to the clinic via telehealth for 3-month follow-up.    Patient is present in her car alone, I am present in the office in a patient room alone.    You have chosen to receive care through a telephone visit. Do you consent to use a telephone visit for your medical care today? YES    This was an audio and video enabled telemedicine encounter.     History of Present Illness  This is a 18-year-old female who presents to the clinic via telehealth for 3-month follow-up.    Anxiety/depression: Has definitely worsened recently, has had a lot of factors that have made this worse for her, and states that she just does not feel like she is in a very good place currently.  Patient states that she has followed up with her psychiatrist, they are remaining her on Lamictal, and she was prescribed Paxil at last visit after she had the ID genetics testing done.  Patient states that she is really scared on trying any new medications, states that she has been that route and has been placed on several medications over and over again without any improvement of symptoms and she did not like the way most of those made her feel.  Did discuss Paxil with her, she states she may consider trying it, also has an appointment with OB/GYN in December.    Patient states that she is been having some upper respiratory symptoms, states that she just does not feel very well, her daughter was recently diagnosed with RSV, and she likely has the same symptoms.  Her biggest issue has been a lot of congestion, persistent cough, that is causing her to not sleep very well at night.  Is currently breast-feeding, was prescribed Bromfed, but her pharmacy was unable to find this.    She also states that she is developed this rash, the rash is actually been there for quite some time, but has worsened recently.  Is unsure if it is related to the increase in stress, or what has  changed that is caused her rash to get worse.  Did send a message with a picture in my chart.  Is very itchy, not painful.  No new detergents, lotions, soaps or shampoos.    The following portions of the patient's history were personally reviewed and updated as appropriate: allergies, current medications, past medical history, past surgical history, past family history, and past social history.       Objective     All labs, imaging, test results, and specialty provider notes reviewed with patient.     Physical Exam  Constitutional:       Appearance: Normal appearance.   Pulmonary:      Effort: Pulmonary effort is normal.   Musculoskeletal:         General: Normal range of motion.   Skin:     General: Skin is warm and dry.   Neurological:      General: No focal deficit present.      Mental Status: She is alert and oriented to person, place, and time.   Psychiatric:         Mood and Affect: Mood normal.         Behavior: Behavior normal.         Thought Content: Thought content normal.         Judgment: Judgment normal.                         Assessment and Plan:  Diagnoses and all orders for this visit:    1. Anxiety (Primary)    2. Mild episode of recurrent major depressive disorder    3. Allergic contact dermatitis, unspecified trigger  -     triamcinolone (KENALOG) 0.1 % ointment; Apply 1 application  topically to the appropriate area as directed 2 (Two) Times a Day.  Dispense: 80 g; Refill: 2    4. Acute cough  -     brompheniramine-pseudoephedrine-DM 30-2-10 MG/5ML syrup; Take 5 mL by mouth 4 (Four) Times a Day As Needed for Cough.  Dispense: 473 mL; Refill: 0    5. Viral upper respiratory tract infection  -     brompheniramine-pseudoephedrine-DM 30-2-10 MG/5ML syrup; Take 5 mL by mouth 4 (Four) Times a Day As Needed for Cough.  Dispense: 473 mL; Refill: 0      Discussed patient extensively that I do think that she would benefit from an SSRI medication, did discuss trying Paxil, I really think she would benefit  from this.  Remain on Lamictal, and patient to continue to follow-up with psychiatry.  Does have an upcoming appointment next week to discuss with a counselor/therapist with that I think will be beneficial as well.  Answered all patient's questions or concerns in regards to this.    Do thinks that patient's rash is likely induced from increased stress, provide triamcinolone cream to use as needed.    Will provide patient with cough medication to try to help with congestion and cough, she is breast-feeding, so did discuss the risks of taking this while on it.      Follow Up:  No follow-ups on file.    Patient was given instructions and counseling regarding her condition or for health maintenance advice. Please see specific information pulled into the AVS if appropriate.     Total Time Spent: 15 minutes

## 2023-12-16 PROBLEM — R30.0 DYSURIA: Status: ACTIVE | Noted: 2023-12-16

## 2023-12-16 PROCEDURE — 87086 URINE CULTURE/COLONY COUNT: CPT | Performed by: NURSE PRACTITIONER

## 2023-12-22 ENCOUNTER — OFFICE VISIT (OUTPATIENT)
Dept: OBSTETRICS AND GYNECOLOGY | Facility: CLINIC | Age: 18
End: 2023-12-22
Payer: COMMERCIAL

## 2023-12-22 VITALS
DIASTOLIC BLOOD PRESSURE: 70 MMHG | SYSTOLIC BLOOD PRESSURE: 106 MMHG | WEIGHT: 179 LBS | BODY MASS INDEX: 28.03 KG/M2 | HEART RATE: 96 BPM

## 2023-12-22 DIAGNOSIS — N89.8 VAGINAL DISCHARGE: ICD-10-CM

## 2023-12-22 DIAGNOSIS — Z01.419 WELL WOMAN EXAM WITH ROUTINE GYNECOLOGICAL EXAM: Primary | ICD-10-CM

## 2023-12-22 DIAGNOSIS — N76.0 ACUTE VAGINITIS: ICD-10-CM

## 2023-12-22 DIAGNOSIS — R30.0 DYSURIA: ICD-10-CM

## 2023-12-22 DIAGNOSIS — Z30.019 ENCOUNTER FOR INITIAL PRESCRIPTION OF CONTRACEPTIVES, UNSPECIFIED CONTRACEPTIVE: ICD-10-CM

## 2023-12-22 PROBLEM — O36.8390 NON-REASSURING ELECTRONIC FETAL MONITORING TRACING: Status: RESOLVED | Noted: 2022-05-13 | Resolved: 2023-12-22

## 2023-12-22 PROBLEM — Z3A.37 37 WEEKS GESTATION OF PREGNANCY: Status: RESOLVED | Noted: 2022-05-13 | Resolved: 2023-12-22

## 2023-12-22 PROBLEM — Z34.80 SUPERVISION OF OTHER NORMAL PREGNANCY, ANTEPARTUM: Status: RESOLVED | Noted: 2021-10-27 | Resolved: 2023-12-22

## 2023-12-22 LAB
BILIRUB BLD-MCNC: NEGATIVE MG/DL
C TRACH RRNA CVX QL NAA+PROBE: NOT DETECTED
CANDIDA SPECIES: POSITIVE
CLARITY, POC: ABNORMAL
COLOR UR: YELLOW
GARDNERELLA VAGINALIS: NEGATIVE
GLUCOSE UR STRIP-MCNC: NEGATIVE MG/DL
KETONES UR QL: NEGATIVE
LEUKOCYTE EST, POC: ABNORMAL
N GONORRHOEA RRNA SPEC QL NAA+PROBE: NOT DETECTED
NITRITE UR-MCNC: NEGATIVE MG/ML
PH UR: 7 [PH] (ref 5–8)
PROT UR STRIP-MCNC: NEGATIVE MG/DL
RBC # UR STRIP: NEGATIVE /UL
SP GR UR: 1.02 (ref 1–1.03)
T VAGINALIS DNA VAG QL PROBE+SIG AMP: NEGATIVE
UROBILINOGEN UR QL: NORMAL

## 2023-12-22 PROCEDURE — 87510 GARDNER VAG DNA DIR PROBE: CPT | Performed by: STUDENT IN AN ORGANIZED HEALTH CARE EDUCATION/TRAINING PROGRAM

## 2023-12-22 PROCEDURE — 99395 PREV VISIT EST AGE 18-39: CPT | Performed by: STUDENT IN AN ORGANIZED HEALTH CARE EDUCATION/TRAINING PROGRAM

## 2023-12-22 PROCEDURE — 87660 TRICHOMONAS VAGIN DIR PROBE: CPT | Performed by: STUDENT IN AN ORGANIZED HEALTH CARE EDUCATION/TRAINING PROGRAM

## 2023-12-22 PROCEDURE — 87480 CANDIDA DNA DIR PROBE: CPT | Performed by: STUDENT IN AN ORGANIZED HEALTH CARE EDUCATION/TRAINING PROGRAM

## 2023-12-22 PROCEDURE — 81002 URINALYSIS NONAUTO W/O SCOPE: CPT | Performed by: STUDENT IN AN ORGANIZED HEALTH CARE EDUCATION/TRAINING PROGRAM

## 2023-12-22 PROCEDURE — 87591 N.GONORRHOEAE DNA AMP PROB: CPT | Performed by: STUDENT IN AN ORGANIZED HEALTH CARE EDUCATION/TRAINING PROGRAM

## 2023-12-22 PROCEDURE — 87491 CHLMYD TRACH DNA AMP PROBE: CPT | Performed by: STUDENT IN AN ORGANIZED HEALTH CARE EDUCATION/TRAINING PROGRAM

## 2023-12-22 RX ORDER — NORETHINDRONE ACETATE AND ETHINYL ESTRADIOL AND FERROUS FUMARATE 1MG-20(24)
1 KIT ORAL DAILY
Qty: 28 TABLET | Refills: 12 | Status: SHIPPED | OUTPATIENT
Start: 2023-12-22 | End: 2024-12-21

## 2023-12-22 RX ORDER — FLUCONAZOLE 150 MG/1
150 TABLET ORAL ONCE
Qty: 1 TABLET | Refills: 0 | Status: SHIPPED | OUTPATIENT
Start: 2023-12-22 | End: 2023-12-22

## 2023-12-22 NOTE — PROGRESS NOTES
Well Woman Visit    Chief Complaint   Patient presents with    Annual Exam     Discuss family planning    Dysuria           HPI  Naatlya GEORGE is a 18 y.o. female, , who presents for annual well woman exam. Patient's last menstrual period was 2023 (exact date).. Right side pain. No fevers or chills.  Upper abdomen more so than in the pelvis.  Having regular menstruations.  Flow lasting approximately 5 to 7 days.  Sexually active.  Does not use anything for contraception.  She does not desire to be pregnant currently.  Patient does not smoke.  Denies any history of DVT/PE,.  Denies any history of chronic hypertension or migraines with auras.  She has been having vaginal discharge causing irritation as well as dysuria.  She was evaluated urgent care and had a urinalysis and culture performed that did not result in any indications of urinary tract infection.  She currently takes Lamictal on a daily basis.  No other acute complaints.  Is weaning child from breast-feeding.      Additional OB/GYN History   Current contraception: contraceptive methods: None  Desires to: start contraception  Last Pap :   Last Completed Pap Smear       This patient has no relevant Health Maintenance data.          Not of age for Pap smear screening  Last MMG :   Last Completed Mammogram       This patient has no relevant Health Maintenance data.           Last Colonoscopy :   Last Completed Colonoscopy       This patient has no relevant Health Maintenance data.          Hx Myriad intake? : Yes.  Qualified? : No    AllergiesAmoxicillin, Amoxicillin-pot clavulanate, Cefprozil, and Sertraline   Family history of uterine, colon, breast, or ovarian cancer: no  Tobacco Usage?: No   OB History          1    Para   1    Term   1            AB        Living   1         SAB        IAB        Ectopic        Molar        Multiple   0    Live Births   1                The additional following portions of the patient's  history were reviewed and updated as appropriate: allergies, current medications, past family history, past medical history, past social history, past surgical history, and problem list.    Review of Systems   Constitutional:  Negative for fatigue and fever.   Respiratory:  Negative for cough and shortness of breath.    Cardiovascular:  Negative for chest pain.   Gastrointestinal:  Negative for abdominal distention and abdominal pain.   Genitourinary:  Positive for vaginal discharge and vaginal pain. Negative for breast discharge, breast lump, breast pain, difficulty urinating, dysuria, menstrual problem, pelvic pain and pelvic pressure.        Dysuria         I have reviewed and agree with the HPI, ROS, and historical information as entered above. Roman Dawkins MD    Objective   /70   Pulse 96   Wt 81.2 kg (179 lb)   LMP 12/01/2023 (Exact Date)   Breastfeeding Yes   BMI 28.03 kg/m²     Physical Exam  Vitals and nursing note reviewed. Exam conducted with a chaperone present.   Constitutional:       General: She is not in acute distress.     Appearance: Normal appearance. She is not toxic-appearing.   HENT:      Head: Normocephalic and atraumatic.   Eyes:      Extraocular Movements: Extraocular movements intact.      Conjunctiva/sclera: Conjunctivae normal.   Cardiovascular:      Rate and Rhythm: Normal rate and regular rhythm.      Pulses: Normal pulses.      Heart sounds: Normal heart sounds.   Pulmonary:      Effort: Pulmonary effort is normal.   Chest:   Breasts:     Aurelio Score is 5.      Right: Normal.      Left: Normal.   Abdominal:      General: There is no distension.      Palpations: Abdomen is soft.      Tenderness: There is no abdominal tenderness.   Genitourinary:     General: Normal vulva.      Exam position: Lithotomy position.      Labia:         Right: No tenderness, lesion or injury.         Left: No tenderness, lesion or injury.       Vagina: Vaginal discharge, erythema and tenderness  present.      Cervix: Normal.      Uterus: Normal.       Adnexa: Right adnexa normal and left adnexa normal.   Musculoskeletal:      Cervical back: Normal range of motion and neck supple.   Skin:     General: Skin is warm and dry.   Neurological:      Mental Status: She is alert and oriented to person, place, and time.   Psychiatric:         Mood and Affect: Mood normal.         Behavior: Behavior normal.         Thought Content: Thought content normal.            Assessment and Plan  Natalya GEORGE is a 18 y.o.  presenting for annual visit with concerns for dysuria and vaginal irritation.  Examination consistent for yeast infection.  Dose of Diflucan to pharmacy.  Additional screening for STI and vaginitis panel performed.  Patient desiring birth control.  Patient with no absolute contraindications to oral contraceptive medication.  She would like to trial OCPs.  She will return to office in 3 months for follow-up on birth control.    WWE, Contraception, and Problem Visit    Diagnoses and all orders for this visit:    1. Well woman exam with routine gynecological exam (Primary)    2. Dysuria  -     POC Urinalysis Dipstick    3. Acute vaginitis  -     fluconazole (Diflucan) 150 MG tablet; Take 1 tablet by mouth 1 (One) Time for 1 dose.  Dispense: 1 tablet; Refill: 0    4. Encounter for initial prescription of contraceptives, unspecified contraceptive  -     norethindrone-ethinyl estradiol-ferrous fumarate (LOESTIN 24 FE) 1-20 MG-MCG(24) per tablet; Take 1 tablet by mouth Daily.  Dispense: 28 tablet; Refill: 12    5. Vaginal discharge  -     Gardnerella vaginalis, Trichomonas vaginalis, Candida albicans, DNA - Swab, Vagina  -     Chlamydia trachomatis, Neisseria gonorrhoeae, PCR - Swab, Cervix        Counseling:  Safe Sex/Condoms  OCP/Hormone use risk RICARDO  Track menses, RTO IF <q21d, >7d long, or heavy        Does not qualify.    She understands the importance of having the above performed in a timely  fashion.  The risks of not performing them include, but are not limited to, advanced cancer stages, bone loss from osteoporosis and/or subsequent increase in morbidity and/or mortality.  She is encouraged to review her results online and/or contact or office if she has questions.     Follow Up:  Return in about 3 months (around 3/22/2024) for Recheck.        Roman Dawkins MD  12/22/2023

## 2023-12-26 ENCOUNTER — TELEPHONE (OUTPATIENT)
Dept: OBSTETRICS AND GYNECOLOGY | Facility: CLINIC | Age: 18
End: 2023-12-26
Payer: COMMERCIAL

## 2023-12-26 NOTE — TELEPHONE ENCOUNTER
----- Message from Roman Dawkins MD sent at 12/23/2023  7:34 AM EST -----  Yeast on vaginitis panel only. Monitor for symptom improvement with Diflucan previously prescribed at time of appointment.

## 2023-12-27 ENCOUNTER — TELEPHONE (OUTPATIENT)
Dept: OBSTETRICS AND GYNECOLOGY | Facility: CLINIC | Age: 18
End: 2023-12-27
Payer: COMMERCIAL

## 2023-12-27 DIAGNOSIS — N76.0 ACUTE VAGINITIS: Primary | ICD-10-CM

## 2023-12-27 RX ORDER — FLUCONAZOLE 150 MG/1
150 TABLET ORAL DAILY
Qty: 3 TABLET | Refills: 1 | Status: SHIPPED | OUTPATIENT
Start: 2023-12-27

## 2023-12-27 NOTE — TELEPHONE ENCOUNTER
Patient called this pm.  She is requesting refill on Diflucan she states still having symptom of yeast .  Last seen 12/22/23.  Last filled 12/22/23 Diflucan # 1 with no refills.

## 2023-12-27 NOTE — TELEPHONE ENCOUNTER
----- Message from Natalya GEORGE sent at 12/27/2023  7:25 AM EST -----  Regarding: Birth control   Contact: [phone]  Hi! The birth control that was sent in for me last Friday , interacts with my medicine the pharmacy said , is there anyway you can send in one that my lamictal won’t interfere with

## 2023-12-27 NOTE — TELEPHONE ENCOUNTER
Patient sent attached message thru Mychart.  Last seen 12/22/23.  Last filled 12/22/23 Loestrin 24 Fe # 28 with 12 refills.  Next appointment 4/5/24.  Called patient no answer. Sent message to Dr Dawkins.

## 2023-12-28 ENCOUNTER — TELEMEDICINE (OUTPATIENT)
Dept: FAMILY MEDICINE CLINIC | Facility: CLINIC | Age: 18
End: 2023-12-28
Payer: COMMERCIAL

## 2023-12-28 DIAGNOSIS — R10.11 RIGHT UPPER QUADRANT ABDOMINAL PAIN: Primary | ICD-10-CM

## 2023-12-28 DIAGNOSIS — Z13.220 SCREENING FOR LIPID DISORDERS: ICD-10-CM

## 2023-12-28 DIAGNOSIS — R11.0 NAUSEA: ICD-10-CM

## 2023-12-28 DIAGNOSIS — M54.6 ACUTE RIGHT-SIDED THORACIC BACK PAIN: ICD-10-CM

## 2023-12-28 DIAGNOSIS — K21.9 GASTROESOPHAGEAL REFLUX DISEASE WITHOUT ESOPHAGITIS: ICD-10-CM

## 2023-12-28 DIAGNOSIS — R19.7 DIARRHEA, UNSPECIFIED TYPE: ICD-10-CM

## 2023-12-28 DIAGNOSIS — R53.83 FATIGUE, UNSPECIFIED TYPE: ICD-10-CM

## 2023-12-28 DIAGNOSIS — Z01.89 ROUTINE LAB DRAW: ICD-10-CM

## 2023-12-28 DIAGNOSIS — E61.1 IRON DEFICIENCY: ICD-10-CM

## 2023-12-28 NOTE — PROGRESS NOTES
Natalya GEORGE presents to [facility] with complaints of right upper quadrant abdominal pain radiating to back, worsening nausea and heartburn, bloating, episode of diarrhea, concern for gallbladder.    You have chosen to receive care through a telephone visit. Do you consent to use a telephone visit for your medical care today? YES    This was an audio and video enabled telemedicine encounter.     History of Present Illness  This is a 18-year-old female who presents to the clinic with complaints of right upper quadrant abdominal pain radiating to back, worsening nausea and heartburn, bloating, episode of diarrhea, and concern for gallbladder.    Patient states that the symptoms have actually been going for about a month.  Patient states that she is originally thought that maybe it was a kidney stone that was causing her issues, actually went to urgent care for evaluation and they determined it was not a kidney stone.  Patient states that it is worse anytime that she eats.  Patient states that she has the right upper quadrant pain that radiates to her back, she has worsening nausea, and did have an episode where for about a week she had an episode of diarrhea.  States the diarrhea is no longer present.  But it is after she eats almost anything.  She states she has never had problems with her gallbladder before, but would like to have this evaluated.  Is even had bloating, excess gas, and acid reflux has been worse as well.  Has not taken any over-the-counter medication.  Denies any recent other symptoms of infectious origin.  No fever/chills/body aches.  No blood in stool.  No vomit.    Patient states that she would also like to get repeat blood work done if getting tested for anything at the lab.    The following portions of the patient's history were personally reviewed and updated as appropriate: allergies, current medications, past medical history, past surgical history,  past family history, and past social history.       Objective     All labs, imaging, test results, and specialty provider notes reviewed with patient.     Physical Exam  Constitutional:       Appearance: Normal appearance.   Pulmonary:      Effort: Pulmonary effort is normal.   Skin:     General: Skin is warm and dry.   Neurological:      General: No focal deficit present.      Mental Status: She is alert and oriented to person, place, and time.   Psychiatric:         Mood and Affect: Mood normal.         Behavior: Behavior normal.         Thought Content: Thought content normal.         Judgment: Judgment normal.                Assessment and Plan:  Diagnoses and all orders for this visit:    1. Right upper quadrant abdominal pain (Primary)  -     US Abdomen Complete; Future    2. Acute right-sided thoracic back pain  -     US Abdomen Complete; Future    3. Nausea  -     H. Pylori Breath Test - Breath, Lung; Future  -     US Abdomen Complete; Future    4. Gastroesophageal reflux disease without esophagitis  -     H. Pylori Breath Test - Breath, Lung; Future  -     US Abdomen Complete; Future    5. Diarrhea, unspecified type  -     US Abdomen Complete; Future    6. Iron deficiency  -     Ferritin; Future  -     Iron Profile; Future    7. Routine lab draw  -     Comprehensive Metabolic Panel; Future  -     CBC Auto Differential; Future  -     TSH Rfx On Abnormal To Free T4; Future  -     Urinalysis With Culture If Indicated -; Future    8. Screening for lipid disorders  -     Lipid Panel; Future    9. Fatigue, unspecified type  -     Vitamin B12 & Folate; Future      We will start with an abdominal ultrasound to evaluate the gallbladder and rule out any origin there, will also test for H. pylori.  Discussed with patient also try to eliminate any foods high in acid, she may start an antacid after testing for H. pylori.  Will also obtain other blood work per patient request since she is going to the lab  anyways.    Follow Up:  No follow-ups on file.    Patient was given instructions and counseling regarding her condition or for health maintenance advice. Please see specific information pulled into the AVS if appropriate.     Total Time Spent: 15 minutes

## 2024-01-05 ENCOUNTER — LAB (OUTPATIENT)
Dept: LAB | Facility: HOSPITAL | Age: 19
End: 2024-01-05
Payer: COMMERCIAL

## 2024-01-05 ENCOUNTER — TELEPHONE (OUTPATIENT)
Dept: OBSTETRICS AND GYNECOLOGY | Facility: CLINIC | Age: 19
End: 2024-01-05

## 2024-01-05 DIAGNOSIS — R53.83 FATIGUE, UNSPECIFIED TYPE: ICD-10-CM

## 2024-01-05 DIAGNOSIS — E61.1 IRON DEFICIENCY: ICD-10-CM

## 2024-01-05 DIAGNOSIS — Z01.89 ROUTINE LAB DRAW: ICD-10-CM

## 2024-01-05 DIAGNOSIS — Z13.220 SCREENING FOR LIPID DISORDERS: ICD-10-CM

## 2024-01-05 LAB
ALBUMIN SERPL-MCNC: 4.6 G/DL (ref 3.5–5.2)
ALBUMIN/GLOB SERPL: 2.1 G/DL
ALP SERPL-CCNC: 53 U/L (ref 39–117)
ALT SERPL W P-5'-P-CCNC: 11 U/L (ref 1–33)
ANION GAP SERPL CALCULATED.3IONS-SCNC: 10.5 MMOL/L (ref 5–15)
AST SERPL-CCNC: 11 U/L (ref 1–32)
BASOPHILS # BLD AUTO: 0.02 10*3/MM3 (ref 0–0.2)
BASOPHILS NFR BLD AUTO: 0.4 % (ref 0–1.5)
BILIRUB SERPL-MCNC: 0.4 MG/DL (ref 0–1.2)
BUN SERPL-MCNC: 11 MG/DL (ref 6–20)
BUN/CREAT SERPL: 15.9 (ref 7–25)
CALCIUM SPEC-SCNC: 9.5 MG/DL (ref 8.6–10.5)
CHLORIDE SERPL-SCNC: 107 MMOL/L (ref 98–107)
CHOLEST SERPL-MCNC: 154 MG/DL (ref 0–200)
CO2 SERPL-SCNC: 23.5 MMOL/L (ref 22–29)
CREAT SERPL-MCNC: 0.69 MG/DL (ref 0.57–1)
DEPRECATED RDW RBC AUTO: 37.1 FL (ref 37–54)
EGFRCR SERPLBLD CKD-EPI 2021: 127.6 ML/MIN/1.73
EOSINOPHIL # BLD AUTO: 0.04 10*3/MM3 (ref 0–0.4)
EOSINOPHIL NFR BLD AUTO: 0.9 % (ref 0.3–6.2)
ERYTHROCYTE [DISTWIDTH] IN BLOOD BY AUTOMATED COUNT: 11.9 % (ref 12.3–15.4)
FERRITIN SERPL-MCNC: 37.6 NG/ML (ref 13–150)
FOLATE SERPL-MCNC: 17.1 NG/ML (ref 4.78–24.2)
GLOBULIN UR ELPH-MCNC: 2.2 GM/DL
GLUCOSE SERPL-MCNC: 83 MG/DL (ref 65–99)
HCT VFR BLD AUTO: 38.3 % (ref 34–46.6)
HDLC SERPL-MCNC: 49 MG/DL (ref 40–60)
HGB BLD-MCNC: 13.3 G/DL (ref 12–15.9)
IMM GRANULOCYTES # BLD AUTO: 0.01 10*3/MM3 (ref 0–0.05)
IMM GRANULOCYTES NFR BLD AUTO: 0.2 % (ref 0–0.5)
IRON 24H UR-MRATE: 83 MCG/DL (ref 37–145)
IRON SATN MFR SERPL: 21 % (ref 20–50)
LDLC SERPL CALC-MCNC: 97 MG/DL (ref 0–100)
LDLC/HDLC SERPL: 2 {RATIO}
LYMPHOCYTES # BLD AUTO: 1.69 10*3/MM3 (ref 0.7–3.1)
LYMPHOCYTES NFR BLD AUTO: 37.1 % (ref 19.6–45.3)
MCH RBC QN AUTO: 30 PG (ref 26.6–33)
MCHC RBC AUTO-ENTMCNC: 34.7 G/DL (ref 31.5–35.7)
MCV RBC AUTO: 86.5 FL (ref 79–97)
MONOCYTES # BLD AUTO: 0.35 10*3/MM3 (ref 0.1–0.9)
MONOCYTES NFR BLD AUTO: 7.7 % (ref 5–12)
NEUTROPHILS NFR BLD AUTO: 2.44 10*3/MM3 (ref 1.7–7)
NEUTROPHILS NFR BLD AUTO: 53.7 % (ref 42.7–76)
NRBC BLD AUTO-RTO: 0 /100 WBC (ref 0–0.2)
PLATELET # BLD AUTO: 225 10*3/MM3 (ref 140–450)
PMV BLD AUTO: 11.5 FL (ref 6–12)
POTASSIUM SERPL-SCNC: 4.1 MMOL/L (ref 3.5–5.2)
PROT SERPL-MCNC: 6.8 G/DL (ref 6–8.5)
RBC # BLD AUTO: 4.43 10*6/MM3 (ref 3.77–5.28)
SODIUM SERPL-SCNC: 141 MMOL/L (ref 136–145)
TIBC SERPL-MCNC: 396 MCG/DL (ref 298–536)
TRANSFERRIN SERPL-MCNC: 266 MG/DL (ref 200–360)
TRIGL SERPL-MCNC: 34 MG/DL (ref 0–150)
TSH SERPL DL<=0.05 MIU/L-ACNC: 0.88 UIU/ML (ref 0.27–4.2)
VIT B12 BLD-MCNC: 494 PG/ML (ref 211–946)
VLDLC SERPL-MCNC: 8 MG/DL (ref 5–40)
WBC NRBC COR # BLD AUTO: 4.55 10*3/MM3 (ref 3.4–10.8)

## 2024-01-05 PROCEDURE — 83540 ASSAY OF IRON: CPT

## 2024-01-05 PROCEDURE — 84466 ASSAY OF TRANSFERRIN: CPT

## 2024-01-05 PROCEDURE — 82607 VITAMIN B-12: CPT

## 2024-01-05 PROCEDURE — 82746 ASSAY OF FOLIC ACID SERUM: CPT

## 2024-01-05 PROCEDURE — 36415 COLL VENOUS BLD VENIPUNCTURE: CPT

## 2024-01-05 PROCEDURE — 80050 GENERAL HEALTH PANEL: CPT

## 2024-01-05 PROCEDURE — 80061 LIPID PANEL: CPT

## 2024-01-05 PROCEDURE — 82728 ASSAY OF FERRITIN: CPT

## 2024-01-05 NOTE — TELEPHONE ENCOUNTER
Caller: Natalya GEORGE    Relationship: Self    Best call back number: [phone]    What is the best time to reach you: ANY TIME ON FRIDAY'S - PT'S DAY OFF    Who are you requesting to speak with (clinical staff, provider,  specific staff member): CLINICAL    Do you know the name of the person who called: ASH    What was the call regarding: MEDICATION ISSUES    Is it okay if the provider responds through MyChart: YES - IT IS ALSO OK TO LEAVE PT A VOICEMAIL

## 2024-01-08 NOTE — TELEPHONE ENCOUNTER
Patient returned call informed her of Dr Dawkins recommendation and scheduled an appointment for 1/19/24

## 2024-01-26 ENCOUNTER — LAB (OUTPATIENT)
Dept: LAB | Facility: HOSPITAL | Age: 19
End: 2024-01-26
Payer: COMMERCIAL

## 2024-01-26 DIAGNOSIS — R11.0 NAUSEA: ICD-10-CM

## 2024-01-26 DIAGNOSIS — K21.9 GASTROESOPHAGEAL REFLUX DISEASE WITHOUT ESOPHAGITIS: ICD-10-CM

## 2024-01-26 PROCEDURE — 83013 H PYLORI (C-13) BREATH: CPT

## 2024-01-28 LAB — UREA BREATH TEST QL: NEGATIVE

## 2024-02-02 ENCOUNTER — HOSPITAL ENCOUNTER (OUTPATIENT)
Dept: ULTRASOUND IMAGING | Facility: HOSPITAL | Age: 19
Discharge: HOME | End: 2024-02-02
Payer: COMMERCIAL

## 2024-02-02 DIAGNOSIS — R10.11 RIGHT UPPER QUADRANT ABDOMINAL PAIN: ICD-10-CM

## 2024-02-02 DIAGNOSIS — R19.7 DIARRHEA, UNSPECIFIED TYPE: ICD-10-CM

## 2024-02-02 DIAGNOSIS — M54.6 ACUTE RIGHT-SIDED THORACIC BACK PAIN: ICD-10-CM

## 2024-02-02 DIAGNOSIS — K21.9 GASTROESOPHAGEAL REFLUX DISEASE WITHOUT ESOPHAGITIS: ICD-10-CM

## 2024-02-02 DIAGNOSIS — R11.0 NAUSEA: ICD-10-CM

## 2024-02-02 PROCEDURE — 76700 US EXAM ABDOM COMPLETE: CPT

## 2024-02-13 ENCOUNTER — TELEPHONE (OUTPATIENT)
Dept: OBSTETRICS AND GYNECOLOGY | Facility: CLINIC | Age: 19
End: 2024-02-13

## 2024-02-28 ENCOUNTER — TELEPHONE (OUTPATIENT)
Dept: OBSTETRICS AND GYNECOLOGY | Facility: CLINIC | Age: 19
End: 2024-02-28
Payer: COMMERCIAL

## 2024-02-28 NOTE — TELEPHONE ENCOUNTER
I called the patient to check on her and to suggest her also calling the Lactation Consultant.  She said that Tylenol is helping some and she just got off from work and is going to try the other suggestions.  She will call her also an if the spot isn't better by tomorrow morning she is going to call us back.

## 2024-02-28 NOTE — TELEPHONE ENCOUNTER
ALVINO due to Dr. Dawkins is out of the office  The patient called in with complaint of a clogged milk duct.  She breast feeds her 2 year old but is in the process of weening.  She is noticing a small lump that is very sore and it feels blocked to her.  She is not noticing any redness, streaking of the breast or fever.  I told her to express as much of her breast milk as possible and to avoid stimulation if possible.  She said she has used a massager in the past to unclog the duct and I told her that she could try that if it worked for her before.  I also suggested using cabbage leaves to help with inflammation.  I advised that if she notices her pain worsening or any signs of infection, she would need to go to the ER.  Do you have any other recommendations for her and when would she need to come in for evaluation?  Please advise. Thanks.

## 2024-02-29 NOTE — PROGRESS NOTES
GYN Problem/Follow Up Visit    Chief Complaint   Patient presents with    Gynecologic Exam     R BR pain, lump.   Possible yeast infection           HPI  Natalya GEORGE is a 18 y.o. female, , who presents for tender right breast x 4 days, lactating, weaning, nursing only at night. Heat, massage, ice, cabbage leafs, tylenol not improving.       Vaginal itch and white discharge x 2 weeks, recent antibiotic use     Hx of bipolar, use of lamictal, desires contraception, previously prescribed loestrin 24, didn't start, plans to conceive within next year, declines LARC     Additional OB/GYN History   Patient's last menstrual period was 2024 (exact date).  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 Benign. Right leg    WISDOM TOOTH EXTRACTION        Family History   Problem Relation Age of Onset    Hypertension Father     Diabetes Mother     Heart disease Mother     Other Mother     Mitral valve prolapse Paternal Grandmother     Mitral valve prolapse Maternal Grandmother     Lung cancer Other     Breast cancer Neg Hx     Ovarian cancer Neg Hx     Uterine cancer Neg Hx     Colon cancer Neg Hx     Prostate cancer Neg Hx      Allergies as of 2024 - Reviewed 2024   Allergen Reaction Noted    Amoxicillin Rash 10/01/2019    Amoxicillin-pot clavulanate Nausea Only and Rash 2012    Cefprozil Rash 2012    Sertraline Other (See Comments) 10/09/2019      The additional following portions of the patient's history were reviewed  and updated as appropriate: allergies, current medications, past family history, past medical history, past social history, past surgical history, and problem list.    Review of Systems    See HPI for pertinent ROS    Objective   /69   Pulse 90   Wt 77.1 kg (170 lb)   LMP 02/14/2024 (Exact Date)   BMI 26.62 kg/m²     Physical Exam  Vitals and nursing note reviewed. Exam conducted with a chaperone present.   Constitutional:       General: She is not in acute distress.     Appearance: Normal appearance. She is well-developed and well-groomed.   Cardiovascular:      Rate and Rhythm: Normal rate and regular rhythm.      Heart sounds: Normal heart sounds.   Pulmonary:      Effort: Pulmonary effort is normal.      Breath sounds: Normal breath sounds.   Chest:   Breasts:     Breasts are symmetrical.      Right: Normal. No inverted nipple, mass, nipple discharge, skin change or tenderness.      Left: Normal. No inverted nipple, mass, nipple discharge, skin change or tenderness.   Genitourinary:     General: Normal vulva.      Vagina: No signs of injury. Vaginal discharge (small amount curdled white) present. No erythema, tenderness, bleeding or lesions.      Cervix: Normal.      Uterus: Normal.       Adnexa: Right adnexa normal and left adnexa normal.   Lymphadenopathy:      Cervical: No cervical adenopathy.      Upper Body:      Right upper body: No supraclavicular, axillary or pectoral adenopathy.      Left upper body: No supraclavicular, axillary or pectoral adenopathy.      Lower Body: No right inguinal adenopathy. No left inguinal adenopathy.   Skin:     General: Skin is warm and dry.   Neurological:      Mental Status: She is alert and oriented to person, place, and time.   Psychiatric:         Mood and Affect: Affect normal.         Behavior: Behavior is cooperative.         Cognition and Memory: Cognition normal.          Assessment and Plan    Diagnoses and all orders for this visit:    1. Acute vaginitis  (Primary)  -     Chlamydia trachomatis, Neisseria gonorrhoeae, PCR - Swab, Cervix  -     Gardnerella vaginalis, Trichomonas vaginalis, Candida albicans, DNA - Swab, Vagina  -     fluconazole (Diflucan) 100 MG tablet; Take 1 tablet by mouth Every 3 (Three) Days for 2 doses.  Dispense: 2 tablet; Refill: 0    2. Mastalgia in female    3. Encounter for surveillance of contraceptive pills  -     POC Pregnancy, Urine  -     norethindrone-ethinyl estradiol-ferrous fumarate (LOESTIN 24 FE) 1-20 MG-MCG(24) per tablet; Take 1 tablet by mouth Daily.  Dispense: 84 tablet; Refill: 3      HCG, Urine, QL   Date Value Ref Range Status   03/01/2024 Negative Negative Final         TRACK MENSES, RTO if <q21d, >7d long, heavy or painful.    All BIRTH CONTROL options R/B/A/SE/E of each reviewed in detail.  SAFE SEX/condoms importance reviewed.    Desires to start CHC, prescription refilled, Nsaids, well fitted bra for suport and cold compress for breast pain while weaning from lactation. Follow up with any signs of infection, redness, heat, fever, nipple discharge, swelling or worsening symptoms.  Suspect candida, recent antibiotic use, curdled white vaginal discharge, will treat empirically    Follow Up:  Return if symptoms worsen or fail to improve.        Kaylie Tobar, APRN  03/01/2024

## 2024-03-01 ENCOUNTER — OFFICE VISIT (OUTPATIENT)
Dept: OBSTETRICS AND GYNECOLOGY | Facility: CLINIC | Age: 19
End: 2024-03-01
Payer: COMMERCIAL

## 2024-03-01 VITALS
HEART RATE: 90 BPM | WEIGHT: 170 LBS | BODY MASS INDEX: 26.62 KG/M2 | DIASTOLIC BLOOD PRESSURE: 69 MMHG | SYSTOLIC BLOOD PRESSURE: 103 MMHG

## 2024-03-01 DIAGNOSIS — Z30.41 ENCOUNTER FOR SURVEILLANCE OF CONTRACEPTIVE PILLS: ICD-10-CM

## 2024-03-01 DIAGNOSIS — N76.0 ACUTE VAGINITIS: Primary | ICD-10-CM

## 2024-03-01 DIAGNOSIS — N64.4 MASTALGIA IN FEMALE: ICD-10-CM

## 2024-03-01 LAB
B-HCG UR QL: NEGATIVE
C TRACH RRNA CVX QL NAA+PROBE: NOT DETECTED
CANDIDA SPECIES: POSITIVE
EXPIRATION DATE: NORMAL
GARDNERELLA VAGINALIS: POSITIVE
INTERNAL NEGATIVE CONTROL: NORMAL
INTERNAL POSITIVE CONTROL: NORMAL
Lab: NORMAL
N GONORRHOEA RRNA SPEC QL NAA+PROBE: NOT DETECTED
T VAGINALIS DNA VAG QL PROBE+SIG AMP: NEGATIVE

## 2024-03-01 PROCEDURE — 87660 TRICHOMONAS VAGIN DIR PROBE: CPT | Performed by: NURSE PRACTITIONER

## 2024-03-01 PROCEDURE — 87591 N.GONORRHOEAE DNA AMP PROB: CPT | Performed by: NURSE PRACTITIONER

## 2024-03-01 PROCEDURE — 87510 GARDNER VAG DNA DIR PROBE: CPT | Performed by: NURSE PRACTITIONER

## 2024-03-01 PROCEDURE — 87491 CHLMYD TRACH DNA AMP PROBE: CPT | Performed by: NURSE PRACTITIONER

## 2024-03-01 PROCEDURE — 87480 CANDIDA DNA DIR PROBE: CPT | Performed by: NURSE PRACTITIONER

## 2024-03-01 RX ORDER — ESCITALOPRAM OXALATE 5 MG/1
1 TABLET ORAL DAILY
COMMUNITY
Start: 2024-02-04

## 2024-03-01 RX ORDER — NORETHINDRONE ACETATE AND ETHINYL ESTRADIOL AND FERROUS FUMARATE 1MG-20(24)
1 KIT ORAL DAILY
Qty: 84 TABLET | Refills: 3 | Status: SHIPPED | OUTPATIENT
Start: 2024-03-01 | End: 2025-03-01

## 2024-03-01 RX ORDER — FLUCONAZOLE 100 MG/1
100 TABLET ORAL
Qty: 2 TABLET | Refills: 0 | Status: SHIPPED | OUTPATIENT
Start: 2024-03-01 | End: 2024-03-05

## 2024-03-04 ENCOUNTER — TELEPHONE (OUTPATIENT)
Dept: OBSTETRICS AND GYNECOLOGY | Facility: CLINIC | Age: 19
End: 2024-03-04
Payer: COMMERCIAL

## 2024-03-04 DIAGNOSIS — B96.89 BV (BACTERIAL VAGINOSIS): Primary | ICD-10-CM

## 2024-03-04 DIAGNOSIS — N76.0 BV (BACTERIAL VAGINOSIS): Primary | ICD-10-CM

## 2024-03-04 RX ORDER — METRONIDAZOLE 7.5 MG/G
GEL VAGINAL
Qty: 70 G | Refills: 0 | Status: SHIPPED | OUTPATIENT
Start: 2024-03-04 | End: 2024-03-09

## 2024-03-04 NOTE — TELEPHONE ENCOUNTER
----- Message from BARRY Hernadez sent at 3/4/2024  8:47 AM EST -----  Bacterial Vaginosis and yeast overgrowth detected, treatment sent to pharmacy for BV, treatment for yeast prescribed at time of appointment. Follow up if symptoms persist or worsen.    Bacterial Vaginosis prevention: recommend use hypoallergenic, PH balanced products including soaps and detergents, sensitive skin products. Avoid scented liners, tampons, wipes, or scented toilet paper. Condom use, avoid multiple partners, douching, bubble bath, anal contact during intercourse, thong underwear, and shaving in genital area. Over the counter vaginal probiotic suppositories may be used to maintain a healthy vaginal PH- example: [employer] online ([employer])

## 2024-05-16 ENCOUNTER — TELEPHONE (OUTPATIENT)
Dept: FAMILY MEDICINE CLINIC | Facility: CLINIC | Age: 19
End: 2024-05-16
Payer: COMMERCIAL

## 2024-05-16 NOTE — TELEPHONE ENCOUNTER
Caller: Natalya GEORGE    Relationship: Self    Best call back number: [phone]     What orders are you requesting (i.e. lab or imaging): FULL BLOOD CHECK FOR ANTI BODY, LEAD AND CBC     In what timeframe would the patient need to come in: AS SOON AS POSSIBLE    Where will you receive your lab/imaging services: [facility]     Additional notes: PLEASE CALL AND ADVISE.

## 2024-05-16 NOTE — TELEPHONE ENCOUNTER
Patient is aware that she needs to be seen. Patient stated that she will call back tomorrow and schedule an appointment she didn't have her schedule with her

## 2024-10-02 ENCOUNTER — TELEMEDICINE (OUTPATIENT)
Dept: FAMILY MEDICINE CLINIC | Facility: CLINIC | Age: 19
End: 2024-10-02
Payer: COMMERCIAL

## 2024-10-02 DIAGNOSIS — L30.1 DYSHIDROTIC ECZEMA: Primary | ICD-10-CM

## 2024-10-02 PROCEDURE — 99213 OFFICE O/P EST LOW 20 MIN: CPT | Performed by: NURSE PRACTITIONER

## 2024-10-02 RX ORDER — PREDNISONE 20 MG/1
40 TABLET ORAL DAILY
Qty: 10 TABLET | Refills: 0 | Status: SHIPPED | OUTPATIENT
Start: 2024-10-02 | End: 2024-10-07

## 2024-10-02 NOTE — PROGRESS NOTES
Chief Complaint  Rash    Subjective         Natalya GEORGE presents to [facility]  Telehealth visit completed for the patient.  Consent obtained.  Patient is at her home in [place].  Provider is in the office in [facility].  She does state that she has a history of eczema and states that topical ointments are not working as well.  She states that she has noticed a rash to her hands that looks as if it is eczema but she has also noticed small little blisters as well.  States that the area does itch and burn occasionally.         Objective     There were no vitals filed for this visit.   There is no height or weight on file to calculate BMI.    BMI is >= 25 and <30. (Overweight) The following options were offered after discussion;: nutrition counseling/recommendations        Physical Exam  Vitals reviewed.   Constitutional:       Appearance: Normal appearance.   Cardiovascular:      Heart sounds: S1 normal and S2 normal. No murmur heard.  Pulmonary:      Effort: Pulmonary effort is normal. No respiratory distress.   Musculoskeletal:        Hands:       Comments: Scaly skin noted, small blisters noted, itching, burning noted    Skin:     General: Skin is warm and dry.   Neurological:      Mental Status: She is alert and oriented to person, place, and time.   Psychiatric:         Attention and Perception: Attention normal.         Mood and Affect: Mood normal.         Behavior: Behavior normal.          Result Review :   The following data was reviewed by: BARRY Mcneil on 10/02/2024:      Procedures    Assessment and Plan   Diagnoses and all orders for this visit:    1. Dyshidrotic eczema (Primary)  -     predniSONE (DELTASONE) 20 MG tablet; Take 2 tablets by mouth Daily for 5 days.  Dispense: 10 tablet; Refill: 0      I did explain to the patient that we would try oral steroids to treat her symptoms.  Explained that if the rash comes back or she has any  other complications or concerns that we would send her back to dermatology for further evaluation and treatment.  I did relay this to her PCP    Follow Up   Return if symptoms worsen or fail to improve.  Patient was given instructions and counseling regarding her condition or for health maintenance advice. Please see specific information pulled into the AVS if appropriate.

## 2024-10-15 ENCOUNTER — TELEPHONE (OUTPATIENT)
Dept: PSYCHIATRY | Facility: CLINIC | Age: 19
End: 2024-10-15
Payer: COMMERCIAL

## 2024-10-15 NOTE — TELEPHONE ENCOUNTER
PATIENT WAS REFERRED OUT TO THERAPIST MISSY ON 11/17/2023, PATIENT HAD APT SCHEDULED FOR 12/08/2024 BUT NO SHOWED AND DID NOT RESCHEDULE, CLOSING OUT REFERRAL.

## 2024-10-17 RX ORDER — CLOBETASOL PROPIONATE 0.5 MG/G
1 CREAM TOPICAL 2 TIMES DAILY
Qty: 30 G | Refills: 0 | Status: SHIPPED | OUTPATIENT
Start: 2024-10-17

## 2024-10-23 ENCOUNTER — TELEPHONE (OUTPATIENT)
Dept: FAMILY MEDICINE CLINIC | Facility: CLINIC | Age: 19
End: 2024-10-23
Payer: COMMERCIAL

## 2024-10-23 NOTE — TELEPHONE ENCOUNTER
Pharmacy Name:  [facility]    Pharmacy representative name: JOSE RAMON    Pharmacy representative phone number: [phone]     What medication are you calling in regards to:     clobetasol propionate (TEMOVATE) 0.05 % cream       What question does the pharmacy have: THEY NEED THE NUMBER OF GRAMS FOR EACH APPLICATION FOR THE INSURANCE    Who is the provider that prescribed the medication: KARRIE    Additional notes: JOSE RAMON STATES THAT THEY FAXED THE OFFICE ABOUT THIS ON 10.18.24 AND HAVEN'T HEARD BACK

## 2024-11-22 ENCOUNTER — TELEMEDICINE (OUTPATIENT)
Dept: FAMILY MEDICINE CLINIC | Facility: CLINIC | Age: 19
End: 2024-11-22
Payer: COMMERCIAL

## 2024-11-22 DIAGNOSIS — J06.9 UPPER RESPIRATORY TRACT INFECTION, UNSPECIFIED TYPE: Primary | ICD-10-CM

## 2024-11-22 PROCEDURE — 99213 OFFICE O/P EST LOW 20 MIN: CPT | Performed by: NURSE PRACTITIONER

## 2024-11-22 RX ORDER — LORAZEPAM 0.5 MG/1
0.5 TABLET ORAL NIGHTLY PRN
COMMUNITY
Start: 2024-11-02

## 2024-11-22 RX ORDER — SULFAMETHOXAZOLE AND TRIMETHOPRIM 800; 160 MG/1; MG/1
1 TABLET ORAL 2 TIMES DAILY
Qty: 20 TABLET | Refills: 0 | Status: SHIPPED | OUTPATIENT
Start: 2024-11-22

## 2024-11-22 RX ORDER — ESCITALOPRAM OXALATE 5 MG/1
5 TABLET ORAL DAILY
COMMUNITY

## 2024-11-22 NOTE — PROGRESS NOTES
Chief Complaint  Sore Throat, URI, Nasal Congestion, and Cough (Green )    Subjective        Natalya GEORGE presents to [facility]  History of Present Illness  Throat started hurting Tuesday and couldn't get comfortable to sleep.  States is warm but not a fever and covid at home is negative.  Green drainage.      The following portions of the patient's history were personally reviewed and updated as appropriate: allergies, current medications, past medical history, past surgical history, past family history, and past social history.     There is no height or weight on file to calculate BMI.           Past History:    Medical History: has a past medical history of Asthma, Bipolar affect, depressed, Chondromalacia (10/13/2017), Claustrophobia, Depression, Headache, and Limb swelling.     Surgical History: has a past surgical history that includes [place] tooth extraction;  section (N/A, 2022); and Skin biopsy ().     Family History: family history includes Diabetes in her mother; Heart disease in her mother; Hypertension in her father; Lung cancer in an other family member; Mitral valve prolapse in her maternal grandmother and paternal grandmother; Other in her mother.     Social History: reports that she has never smoked. She has never been exposed to tobacco smoke. She has never used smokeless tobacco. She reports that she does not currently use alcohol. She reports that she does not use drugs.    Allergies: Amoxicillin, Amoxicillin-pot clavulanate, Cefprozil, and Sertraline          Current Outpatient Medications:     clobetasol propionate (TEMOVATE) 0.05 % cream, Apply 1 Application topically to the appropriate area as directed 2 (Two) Times a Day., Disp: 30 g, Rfl: 0    escitalopram (LEXAPRO) 5 MG tablet, Take 1 tablet by mouth Daily., Disp: , Rfl:     lamoTRIgine (LaMICtal) 25 MG tablet, Take 1 tablet by mouth Daily for 90 days., Disp: 90 tablet, Rfl: 0     LORazepam (ATIVAN) 0.5 MG tablet, Take 1 tablet by mouth At Night As Needed for Anxiety., Disp: , Rfl:     Chlorcyclizine-Pseudoephed 25-60 MG tablet, Take 1 each by mouth 2 (Two) Times a Day., Disp: 42 tablet, Rfl: 0    sulfamethoxazole-trimethoprim (Bactrim DS) 800-160 MG per tablet, Take 1 tablet by mouth 2 (Two) Times a Day., Disp: 20 tablet, Rfl: 0    Medications Discontinued During This Encounter   Medication Reason    escitalopram (LEXAPRO) 5 MG tablet *Therapy completed    norethindrone-ethinyl estradiol-ferrous fumarate (LOESTIN 24 FE) 1-20 MG-MCG(24) per tablet *Therapy completed         Review of Systems   Constitutional:  Negative for fever.   HENT:  Positive for congestion, postnasal drip, sinus pressure and sore throat.    Respiratory:  Negative for cough and shortness of breath.    Cardiovascular:  Negative for chest pain, palpitations and leg swelling.   Neurological:  Negative for dizziness, weakness, numbness and headache.        Objective         There were no vitals filed for this visit.  There is no height or weight on file to calculate BMI.         Physical Exam  Constitutional:       Appearance: Normal appearance. She is not ill-appearing.   Pulmonary:      Effort: Pulmonary effort is normal.   Neurological:      Mental Status: She is oriented to person, place, and time.   Psychiatric:         Mood and Affect: Mood normal.         Behavior: Behavior normal.         Thought Content: Thought content normal.             Result Review :               Assessment and Plan     Diagnoses and all orders for this visit:    1. Upper respiratory tract infection, unspecified type (Primary)  -     sulfamethoxazole-trimethoprim (Bactrim DS) 800-160 MG per tablet; Take 1 tablet by mouth 2 (Two) Times a Day.  Dispense: 20 tablet; Refill: 0  -     Chlorcyclizine-Pseudoephed 25-60 MG tablet; Take 1 each by mouth 2 (Two) Times a Day.  Dispense: 42 tablet; Refill: 0      Twilio video visit  with patients consent  lasting 15  minutes [facility] at [facility].        Follow Up     No follow-ups on file.    Patient was given instructions and counseling regarding her condition or for health maintenance advice. Please see specific information pulled into the AVS if appropriate.

## 2024-12-12 NOTE — PROGRESS NOTES
"Well Woman Visit    CC: Scheduled annual well gyn visit  Chief Complaint   Patient presents with    Gynecologic Exam         HPI:   19 y.o.   Social History     Substance and Sexual Activity   Sexual Activity Yes    Partners: Male    Birth control/protection: Coitus interruptus       Menses:   q 28-30 days, lasts 5-7 days, with 4 heavy days  Pain with menses:  Moderate, not too bad  Patient is 25 yo or younger and in a mutually monogamous relationship and DECLINES GC/CT testing.  She understands she could be asymptomatic and it could affect future fertility    PCP: does manage PMHx and preventative labs  History: PMHx, Meds, Allergies, PSHx, Social Hx, and POBHx all reviewed and updated.    Patient has concerns today.   Is having burning, discharge and itching.  Did have two warts taken off, was told she need a pap because of the appearance of the lesions.   Has some nipple cracking on her left breast, has tried clobetasol and triamcinolone. Nothing has helped, it is worse in the winter time. Is not breast feeding.     PHYSICAL EXAM:  /77   Pulse 92   Ht 170.2 cm (67.01\")   Wt 87.5 kg (193 lb)   LMP 2024 (Approximate)   BMI 30.22 kg/m²  Not found.     Exam conducted with a chaperone present  General- NAD, alert and oriented, appropriate  Psych- Normal mood, good memory  Neck- No masses, no thyroid enlargement  CV- Regular rhythm, no murnurs  Resp- CTA to bases, no wheezes  Abdomen- Soft, non distended, non tender, no masses    Breast left-  Bilaterally symmetrical, no masses, non tender, no nipple discharge.  Skin around nipple appears cracked and irritated  Breast right- Bilaterally symmetrical, no masses, non tender, no nipple discharge    External genitalia- Normal female, no lesions  Urethra/meatus- Normal, no masses, non tender  Bladder- Normal, no masses, non tender  Vagina- Normal, no atrophy, no lesions, thin whitedischarge.  Prolapse : none noted   Cvx- Normal, no lesions, no " discharge, No cervical motion tenderness  Uterus- Normal size, shape & consistency.  Non tender, mobile.  Adnexa- No mass, non tender  Anus/Rectum/Perineum- Not performed    Lymphatic- No palpable neck, axillary, or groin nodes  Ext- No edema, no cyanosis    Skin- No lesions, no rashes, no acanthosis nigricans      ASSESSMENT and PLAN:    Diagnoses and all orders for this visit:    1. Encounter for gynecological examination with abnormal finding (Primary)  -     IGP,rfx Aptima HPV All Pth    2. Vaginal discharge  -     NuSwab BV & Candida - Swab, Cervix  -     metroNIDAZOLE (METROGEL) 0.75 % vaginal gel; Insert 1 Applicatorful into the vagina Daily for 5 days. Insert one full applicator daily for 5 days  Dispense: 70 g; Refill: 0    3. Nipple dermatitis  -     clotrimazole 7.5 g, mupirocin 7.5 g, nystatin 7.5 g; Apply to nipples sparingly PRN nipple irritation up to four times a day.  Gently wipe off before breast feeding.  Dispense: 45 g; Refill: 0        Preventative:  BREAST HEALTH- Monthly self breast exam importance and how to reviewed. MMG and/or MRI (prn) reviewed per society guidelines and her individual history. Screen: Not medically needed  CERVICAL CANCER Screening- Reviewed current ASCCP guidelines for screening w and wo cotest HPV, age specific.  Screen: Updated today  SEXUAL HEALTH: STD screening recommended.  Ordered  VACCINATIONS Recommended: Covid vaccine, Flu annually.  Importance discussed, risk being unvaccinated reviewed.  Questions answered  Smoking status- NON SMOKER/VAPER        She understands the importance of having any ordered tests to be performed in a timely fashion.  The risks of not performing them include, but are not limited to, advanced cancer stages, bone loss from osteoporosis and/or subsequent increase in morbidity and/or mortality.  She is encouraged to review her results online and/or contact or office if she has questions.     Follow Up:  Return in about 1 year (around  1/3/2026) for Annual physical.        Lenny Lane, APRN  01/03/2025    [facility] [address]  [facility]  [address] [id number]  Dept: [phone]  Dept Fax: [phone]  Loc: [phone]

## 2025-01-03 ENCOUNTER — OFFICE VISIT (OUTPATIENT)
Dept: OBSTETRICS AND GYNECOLOGY | Facility: CLINIC | Age: 20
End: 2025-01-03
Payer: COMMERCIAL

## 2025-01-03 VITALS
SYSTOLIC BLOOD PRESSURE: 112 MMHG | DIASTOLIC BLOOD PRESSURE: 77 MMHG | HEIGHT: 67 IN | BODY MASS INDEX: 30.29 KG/M2 | HEART RATE: 92 BPM | WEIGHT: 193 LBS

## 2025-01-03 DIAGNOSIS — N89.8 VAGINAL DISCHARGE: ICD-10-CM

## 2025-01-03 DIAGNOSIS — Z01.411 ENCOUNTER FOR GYNECOLOGICAL EXAMINATION WITH ABNORMAL FINDING: Primary | ICD-10-CM

## 2025-01-03 DIAGNOSIS — L30.9 NIPPLE DERMATITIS: ICD-10-CM

## 2025-01-03 PROCEDURE — 87801 DETECT AGNT MULT DNA AMPLI: CPT | Performed by: NURSE PRACTITIONER

## 2025-01-03 PROCEDURE — G0123 SCREEN CERV/VAG THIN LAYER: HCPCS | Performed by: NURSE PRACTITIONER

## 2025-01-03 PROCEDURE — 87798 DETECT AGENT NOS DNA AMP: CPT | Performed by: NURSE PRACTITIONER

## 2025-01-03 RX ORDER — METRONIDAZOLE 7.5 MG/G
1 GEL VAGINAL DAILY
Qty: 70 G | Refills: 0 | Status: SHIPPED | OUTPATIENT
Start: 2025-01-03 | End: 2025-01-08

## 2025-01-07 ENCOUNTER — TELEPHONE (OUTPATIENT)
Dept: OBSTETRICS AND GYNECOLOGY | Facility: CLINIC | Age: 20
End: 2025-01-07
Payer: COMMERCIAL

## 2025-01-07 DIAGNOSIS — B37.9 CANDIDIASIS: Primary | ICD-10-CM

## 2025-01-07 LAB
A VAGINAE DNA VAG QL NAA+PROBE: ABNORMAL SCORE
BVAB2 DNA VAG QL NAA+PROBE: ABNORMAL SCORE
C ALBICANS DNA VAG QL NAA+PROBE: POSITIVE
C GLABRATA DNA VAG QL NAA+PROBE: NEGATIVE
CONV .: NORMAL
CYTOLOGIST CVX/VAG CYTO: NORMAL
CYTOLOGY CVX/VAG DOC CYTO: NORMAL
CYTOLOGY CVX/VAG DOC THIN PREP: NORMAL
DX ICD CODE: NORMAL
Lab: NORMAL
MEGA1 DNA VAG QL NAA+PROBE: ABNORMAL SCORE
OTHER STN SPEC: NORMAL
STAT OF ADQ CVX/VAG CYTO-IMP: NORMAL

## 2025-01-07 RX ORDER — FLUCONAZOLE 150 MG/1
150 TABLET ORAL ONCE
Qty: 1 TABLET | Refills: 0 | Status: SHIPPED | OUTPATIENT
Start: 2025-01-07 | End: 2025-01-07 | Stop reason: ALTCHOICE

## 2025-01-07 RX ORDER — CLOTRIMAZOLE 1 %
CREAM WITH APPLICATOR VAGINAL NIGHTLY
Qty: 45 G | Refills: 0 | Status: SHIPPED | OUTPATIENT
Start: 2025-01-07

## 2025-01-07 NOTE — TELEPHONE ENCOUNTER
Results given to patient. Patient voiced would like to know if still can take medication sent in due to taking lexapro as well.

## 2025-01-07 NOTE — TELEPHONE ENCOUNTER
----- Message from Lenny Lane sent at 1/7/2025  3:08 PM EST -----  Please let patient know her swab shows a yeast infection.  Prescription sent to pharmacy.

## 2025-01-07 NOTE — TELEPHONE ENCOUNTER
I have switch her to a vaginal treatment that will not interact at all with her lexapro.  She can stop the metronidazole gel.

## 2025-02-12 RX ORDER — CLOTRIMAZOLE 1 %
CREAM WITH APPLICATOR VAGINAL
Qty: 45 G | Refills: 0 | Status: SHIPPED | OUTPATIENT
Start: 2025-02-12

## 2025-02-14 ENCOUNTER — TELEPHONE (OUTPATIENT)
Dept: FAMILY MEDICINE CLINIC | Facility: CLINIC | Age: 20
End: 2025-02-14
Payer: COMMERCIAL

## 2025-02-14 NOTE — TELEPHONE ENCOUNTER
RELAY    Patient was scheduled for 2/17/25 with [facility] for LABS/ CHECKUP - REQUESTED DATE, RESCHEDULED FROM 2.14.25 [facility].  This appointment will be cancelled as patient needs to see PCP.

## 2025-08-12 ENCOUNTER — TELEPHONE (OUTPATIENT)
Dept: OBSTETRICS AND GYNECOLOGY | Age: 20
End: 2025-08-12
Payer: COMMERCIAL

## 2025-08-12 DIAGNOSIS — Z32.00 ENCOUNTER FOR PREGNANCY TEST, RESULT UNKNOWN: Primary | ICD-10-CM

## 2025-08-14 ENCOUNTER — LAB (OUTPATIENT)
Facility: HOSPITAL | Age: 20
End: 2025-08-14
Payer: COMMERCIAL

## 2025-08-14 ENCOUNTER — RESULTS FOLLOW-UP (OUTPATIENT)
Dept: OBSTETRICS AND GYNECOLOGY | Age: 20
End: 2025-08-14
Payer: COMMERCIAL

## 2025-08-14 DIAGNOSIS — Z32.00 ENCOUNTER FOR PREGNANCY TEST, RESULT UNKNOWN: ICD-10-CM

## 2025-08-14 DIAGNOSIS — O36.80X0 PREGNANCY WITH INCONCLUSIVE FETAL VIABILITY, SINGLE OR UNSPECIFIED FETUS: Primary | ICD-10-CM

## 2025-08-14 LAB — HCG INTACT+B SERPL-ACNC: 28.05 MIU/ML

## 2025-08-14 PROCEDURE — 36415 COLL VENOUS BLD VENIPUNCTURE: CPT

## 2025-08-14 PROCEDURE — 84702 CHORIONIC GONADOTROPIN TEST: CPT

## 2025-08-16 ENCOUNTER — LAB (OUTPATIENT)
Facility: HOSPITAL | Age: 20
End: 2025-08-16
Payer: COMMERCIAL

## 2025-08-16 DIAGNOSIS — Z32.00 ENCOUNTER FOR PREGNANCY TEST, RESULT UNKNOWN: ICD-10-CM

## 2025-08-16 LAB — HCG INTACT+B SERPL-ACNC: 105.1 MIU/ML

## 2025-08-16 PROCEDURE — 36415 COLL VENOUS BLD VENIPUNCTURE: CPT

## 2025-08-16 PROCEDURE — 84702 CHORIONIC GONADOTROPIN TEST: CPT

## 2025-08-18 ENCOUNTER — RESULTS FOLLOW-UP (OUTPATIENT)
Dept: OBSTETRICS AND GYNECOLOGY | Age: 20
End: 2025-08-18
Payer: COMMERCIAL

## 2025-08-18 ENCOUNTER — TELEPHONE (OUTPATIENT)
Dept: OBSTETRICS AND GYNECOLOGY | Age: 20
End: 2025-08-18
Payer: COMMERCIAL

## 2025-08-18 DIAGNOSIS — O36.80X0 PREGNANCY WITH INCONCLUSIVE FETAL VIABILITY, SINGLE OR UNSPECIFIED FETUS: Primary | ICD-10-CM

## (undated) DEVICE — GLV SURG SENSICARE PI LF PF 8 GRN STRL

## (undated) DEVICE — SUT GUT CHRM 1 915H BX/36

## (undated) DEVICE — PAD GRND REM POLYHESIVE A/ DISP

## (undated) DEVICE — TRY CATH FOL ADVANCE SIL W/BAG 16F

## (undated) DEVICE — CVR HNDL LT SURG ACCSSRY BLU STRL

## (undated) DEVICE — INTENDED FOR TISSUE SEPARATION, AND OTHER PROCEDURES THAT REQUIRE A SHARP SURGICAL BLADE TO PUNCTURE OR CUT.: Brand: BARD-PARKER ® CARBON RIB-BACK BLADES

## (undated) DEVICE — PENCL E/S SMOKEEVAC W/TELESCP CANN

## (undated) DEVICE — DEV TRANSF BLD W/LUER ADPT CA/198

## (undated) DEVICE — NEEDLE,18GX1.5",REG,BEVEL: Brand: MEDLINE

## (undated) DEVICE — C SECTION PACK: Brand: MEDLINE INDUSTRIES, INC.

## (undated) DEVICE — Device: Brand: PORTEX

## (undated) DEVICE — SUT VIC 3/0 CTI 36IN J944H

## (undated) DEVICE — UNDYED BRAIDED (POLYGLACTIN 910), SYNTHETIC ABSORBABLE SUTURE: Brand: COATED VICRYL

## (undated) DEVICE — VIOLET BRAIDED (POLYGLACTIN 910), SYNTHETIC ABSORBABLE SUTURE: Brand: COATED VICRYL

## (undated) DEVICE — LARGE, DISPOSABLE C-SECTION RETRACTOR: Brand: ALEXIS ® O C-SECTION PROTECTOR/RETRACTOR

## (undated) DEVICE — GLV SURG BIOGEL M LTX PF 7 1/2